# Patient Record
Sex: FEMALE | Race: WHITE
[De-identification: names, ages, dates, MRNs, and addresses within clinical notes are randomized per-mention and may not be internally consistent; named-entity substitution may affect disease eponyms.]

---

## 2018-10-21 NOTE — RAD
CTA CHEST, PE PROTOCOL, CT ABDOMEN AND PELVIS WITH CONTRAST

 

Clinical Indication: Chest pain, shortness of air, abdominal pain

 

COMPARISON: CT abdomen and pelvis dated 11/27/2017, chest radiograph dated

11/27/2017

 

TECHNIQUE:

 

Multiple contiguous axial images were obtained throughout the chest, 

abdomen, and pelvis with the use of IV contrast. Chest images were 

obtained following PE protocol. Coronal and sagittal MIPS reconstructions 

were performed. Coronal and sagittal reconstructions of the abdomen and 

pelvis CT were also performed. 75 mL Omni 300 was administered.

 

CTA Chest Findings:  

Limited evaluation of the distal pulmonary arterial system due to 

suboptimal distal contrast opacification. No evidence of central, lobar, 

or segmental pulmonary embolism.

 

The thyroid is symmetric. 

 

Calcified mediastinal and right hilar lymph nodes related to remote 

granulomatous disease. There is no axillary, mediastinal, or hilar 

adenopathy.  

 

The thoracic aorta diameter is normal. The cardiac size is normal.  There 

is no pericardial effusion.

 

Right lower lobe linear opacities likely related to atelectasis or 

scarring. There is no suspicious pulmonary nodule. There is no  focal 

consolidation. No pleural effusion is observed. There is no pneumothorax. 

Small tracheal diverticulum. The central airways are patent.

 

 

CT Abdomen findings:

The liver is enlarged measuring 19.2 cm. There again may be a 

microlobulated contour of the liver. The liver is otherwise normal. 

Contracted gallbladder. No radiopaque gallstone or pericholecystic fluid. 

The spleen is enlarged measuring 14.5 cm. The pancreas is normal in 

appearance. The adrenal glands are normal in appearance. The kidneys are 

unremarkable.  There is no significant mesenteric or retroperitoneal 

adenopathy identified.  There is no evidence of free intraperitoneal fluid

or pneumoperitoneum.  Mild colonic diverticulosis without evidence of 

diverticulitis. Visualized portions of the bowel are otherwise grossly 

unremarkable. Normal appendix. Mild atherosclerosis of the abdominal aorta

and its branches.

 

CT Pelvis findings:

The bladder is underdistended limiting evaluation for wall thickening. 

Fluid within the endometrial canal. 2.7 x 2.4 cm left ovarian cyst.  There

is no significant pelvic ascites.  No significant iliac or inguinal 

adenopathy is identified.  

 

No acute osseous abnormality. Mild multilevel degenerative changes of the 

visualized spine.

 

IMPRESSION:

1. Limited evaluation of the distal pulmonary arterial system due to 

suboptimal distal contrast opacification. No evidence of central, lobar, 

or segmental pulmonary embolism.

2. No acute intrathoracic, intra-abdominal, or intrapelvic process 

identified.

3. Hepatosplenomegaly. Possible microlobular contour of the liver which 

can be seen with cirrhosis, similar to prior exam.

4. Fluid within the endometrial canal and 2.7 x 2.4 cm left ovarian cyst 

are most likely physiologic given patient's age.

 

 

 

PQRS Compliance Statement:

 

One or more of the following individualized dose reduction techniques were

utilized for this examination:  

1. Automated exposure control  

2. Adjustment of the mA and/or kV according to patient size  

3. Use of iterative reconstruction technique

 

Electronically signed by: John Ochoa MD (10/21/2018 4:55 PM) 

[facility]

## 2018-10-21 NOTE — RAD
CT HEAD WO CONTRAST 

 

Date: 10/21/2018 4:41 PM 

 

Clinical Indication: HEADACHE

 

Comparison: CT head dated 11/27/2017.

 

Technique:  5 mm axial tomographic images were obtained of the head 

without contrast. These were viewed on brain and bone windows. 

 

CT HEAD FINDINGS:

The brain parenchyma is normal in attenuation. No intra- or extra-axial 

mass or fluid collection. No acute hemorrhage. The ventricles are normal 

in size, shape, and morphology. The gray-white matter junction is normal. 

The basilar cisterns are patent. 

 

Small bilateral maxillary sinus mucus retention cysts. The nasal septum is

deviated to the right. The orbits are normal. The globes are intact. The 

mastoid air cells are clear. No aggressive osseous lesion or fracture. 

 

Impression:

No acute intracranial process.

 

 

 

 

 

PQRS Compliance Statement:

 

One or more of the following individualized dose reduction techniques were

utilized for this examination:  

1. Automated exposure control  

2. Adjustment of the mA and/or kV according to patient size  

3. Use of iterative reconstruction technique

 

Electronically signed by: John Ochoa MD (10/21/2018 5:55 PM) 

[facility]

## 2018-10-21 NOTE — RAD
PORTABLE CHEST 1V

 

INDICATION:  CHEST PAIN, NECK PAIN, ABD PAIN X 3 DAYS.  HX HYPERTENSION. 

 

COMPARISON:  Chest radiograph dated 11/27/2017

 

FINDINGS:  

 

Low lung volume. Bibasilar heterogenous airspace opacities. Unchanged 

pulmonary vasculature. No pleural effusion or pneumothorax. The 

cardiomediastinal silhouette is normal. Mildly atherosclerotic thoracic 

aorta. No acute osseous abnormality.

 

IMPRESSION:  

Bibasilar heterogenous airspace opacities could relate to atelectasis. 

Underlying infectious process or asymmetric edema would be difficult to 

exclude. 

 

Electronically signed by: John Ochoa MD (10/21/2018 2:53 PM) 

[facility]

## 2018-10-21 NOTE — PHYS DOC
Past Medical History


Past Medical History:  Hypertension, Hepatitis, Other


Additional Past Medical Histor:  HEP C, OCD,ADHD, ENCEPHALITIS, PLEURISEY


Past Surgical History:  


Smoking:  Cigarettes, 1 Pack Per Day


Alcohol Use:  None


Drug Use:  Marijuana





Adult General


Chief Complaint


Chief Complaint:  multiple medical complaints





HPI


HPI


Patient is a 48-year-old female presents to the emergency department for 

evaluation. She states that for the past 3 days she has had pain all over her 

body, both arthralgias and myalgias. She admits to having some anterior chest 

discomfort which began yesterday, and has been persistent. The pain is 

described as an achy pain in the left side of her chest and is worse with deep 

breathing. She also reports some generalized abdominal pain worsen her upper 

abdomen. She states she has had a few episodes of vomiting and has not had a 

bowel movement in a few days. She has not had any bloody emesis, or bloody 

stools. She does feel somewhat more short of breath and baseline. She has not 

had any fevers or chills. She has a history of hypertension, but states that 

she does not have a primary care provider, does not take any medications. Other 

than the stated above, there are no alleviating or exacerbating factors to her 

symptoms.





Review of Systems


Review of Systems





Constitutional: Denies fever or chills []


Eyes: Denies change in visual acuity, redness, or eye pain []


HENT: Denies nasal congestion or sore throat []


Respiratory: Denies cough or shortness of breath []


Cardiovascular: No additional information not addressed in HPI []


GI: Denies bloody stools or diarrhea []


: Denies dysuria or hematuria []


Musculoskeletal: Reports diffuse myalgias and arthralgias[]


Integument: Denies rash or skin lesions []


Neurologic: Denies headache, focal weakness or sensory changes []


Endocrine: Denies polyuria or polydipsia []





All other systems were reviewed and found to be within normal limits, except as 

documented in this note.





Current Medications


Current Medications





Current Medications








 Medications


  (Trade)  Dose


 Ordered  Sig/Curtis  Start Time


 Stop Time Status Last Admin


Dose Admin


 


 Info


  (CONTRAST GIVEN


 -- Rx MONITORING)  1 each  PRN DAILY  PRN  10/21/18 14:45


 10/23/18 14:44   





 


 Iohexol


  (Omnipaque 300


 Mg/ml)  75 ml  1X  ONCE  10/21/18 14:45


 10/21/18 14:46 DC 10/21/18 16:00


75 ML


 


 Labetalol HCl


  (Normodyne Iv


 Push)  20 mg  1X  ONCE  10/21/18 14:30


 10/21/18 14:31 DC 10/21/18 14:57


20 MG


 


 Morphine Sulfate


  (Morphine


 Sulfate)  4 mg  PRN Q15MIN  PRN  10/21/18 14:30


 10/22/18 14:29   





 


 Nitroglycerin


  (Nitro-Bid Oint)  1 inch  1X  ONCE  10/21/18 14:30


 10/21/18 14:31 DC 10/21/18 14:56


1 INCH











Allergies


Allergies





Allergies








Coded Allergies Type Severity Reaction Last Updated Verified


 


  No Known Drug Allergies    17 No











Physical Exam


Physical Exam


PHYSICAL EXAM:





CONSTITUTIONAL: Well developed, well nourished


HEAD: normocephalic, atraumatic


EENT: PERRL, EOMI. Conjunctivae normal color, sclerae non-icteric; moist mucous 

membranes.


NECK: Supple, non-tender; no meningismus.


LUNGS: Lungs CTA, breathing even and unlabored. Normal air movement.


HEART: Regular rate and rhythm, no murmur


CHEST: No deformity; there is diffuse tenderness to palpation to the anterior 

chest wall.


ABDOMEN: The abdomen is soft, mildly distended. Normal bowel sounds are 

present. There is diffuse tenderness to palpation to the entire abdomen, 

without focal tenderness, rebound, or guarding. no masses or bruits.


EXTREM: Normal ROM; no deformity, no calf tenderness. Normal pulses palpable in 

all extremities. There is no pedal edema.


SKIN: No rash; no diaphoresis


NEURO: Alert; normal speech and cognition; CN's grossly intact; strength 

grossly intact without focal deficit.


BACK: No CVA TTP.





Current Patient Data


Vital Signs





 Vital Signs








  Date Time  Temp Pulse Resp B/P (MAP) Pulse Ox O2 Delivery O2 Flow Rate FiO2


 


10/21/18 14:57  112  216/95    


 


10/21/18 14:11 99.6  20  98 Room Air  





 99.6       








Lab Values





 Laboratory Tests








Test


 10/21/18


14:38 10/21/18


15:07 10/21/18


15:36


 


White Blood Count


 7.2 x10^3/uL


(4.0-11.0) 


 





 


Red Blood Count


 4.29 x10^6/uL


(3.50-5.40) 


 





 


Hemoglobin


 11.0 g/dL


(12.0-15.5)  L 


 





 


Hematocrit


 33.3 %


(36.0-47.0)  L 


 





 


Mean Corpuscular Volume


 78 fL ()


L 


 





 


Mean Corpuscular Hemoglobin 26 pg (25-35)    


 


Mean Corpuscular Hemoglobin


Concent 33 g/dL


(31-37) 


 





 


Red Cell Distribution Width


 16.1 %


(11.5-14.5)  H 


 





 


Platelet Count


 202 x10^3/uL


(140-400) 


 





 


Neutrophils (%) (Auto) 74 % (31-73)  H  


 


Lymphocytes (%) (Auto) 17 % (24-48)  L  


 


Monocytes (%) (Auto) 8 % (0-9)    


 


Eosinophils (%) (Auto) 1 % (0-3)    


 


Basophils (%) (Auto) 0 % (0-3)    


 


Neutrophils # (Auto)


 5.3 x10^3uL


(1.8-7.7) 


 





 


Lymphocytes # (Auto)


 1.2 x10^3/uL


(1.0-4.8) 


 





 


Monocytes # (Auto)


 0.6 x10^3/uL


(0.0-1.1) 


 





 


Eosinophils # (Auto)


 0.1 x10^3/uL


(0.0-0.7) 


 





 


Basophils # (Auto)


 0.0 x10^3/uL


(0.0-0.2) 


 





 


Sodium Level


 136 mmol/L


(136-145) 


 





 


Potassium Level


 3.7 mmol/L


(3.5-5.1) 


 





 


Chloride Level


 98 mmol/L


() 


 





 


Carbon Dioxide Level


 28 mmol/L


(21-32) 


 





 


Anion Gap 10 (6-14)    


 


Blood Urea Nitrogen


 7 mg/dL (7-20)


 


 





 


Creatinine


 0.9 mg/dL


(0.6-1.0) 


 





 


Estimated GFR


(Cockcroft-Gault) 66.5  


 


 





 


BUN/Creatinine Ratio 8 (6-20)    


 


Glucose Level


 216 mg/dL


(70-99)  H 


 





 


Lactic Acid Level


 2.1 mmol/L


(0.4-2.0)  H 


 





 


Calcium Level


 8.7 mg/dL


(8.5-10.1) 


 





 


Magnesium Level


 1.7 mg/dL


(1.8-2.4)  L 


 





 


Total Bilirubin


 0.5 mg/dL


(0.2-1.0) 


 





 


Aspartate Amino Transferase


(AST) 60 U/L (15-37)


H 


 





 


Alanine Aminotransferase (ALT)


 86 U/L (14-59)


H 


 





 


Alkaline Phosphatase


 115 U/L


() 


 





 


Creatine Kinase


 38 U/L


() 


 





 


Creatine Kinase MB (Mass)


 0.9 ng/mL


(0.0-3.6) 


 





 


Creatine Kinase MB Relative


Index 2.4 % (0-4)  


 


 





 


Troponin I Quantitative


 < 0.017 ng/mL


(0.000-0.055) 


 





 


NT-Pro-B-Type Natriuretic


Peptide 187 pg/mL


(0-124)  H 


 





 


Total Protein


 8.1 g/dL


(6.4-8.2) 


 





 


Albumin


 3.2 g/dL


(3.4-5.0)  L 


 





 


Albumin/Globulin Ratio


 0.7 (1.0-1.7)


L 


 





 


Lipase


 138 U/L


() 


 





 


Thyroid Stimulating Hormone


(TSH) 1.349 uIU/mL


(0.358-3.74) 


 





 


Free Thyroxine


 1.04 ng/dL


(0.76-1.46) 


 





 


Influenza Type A Antigen


 Negative


(NEGATIVE) 


 





 


Influenza Type B Antigen


 Negative


(NEGATIVE) 


 





 


Urine Collection Type  Unknown   


 


Urine Color  Yellow   


 


Urine Clarity  Cloudy   


 


Urine pH  7.0   


 


Urine Specific Gravity  >=1.030   


 


Urine Protein


 


 Negative mg/dL


(NEG-TRACE) 





 


Urine Glucose (UA)


 


 >=1000 mg/dL


(NEG) 





 


Urine Ketones (Stick)


 


 Negative mg/dL


(NEG) 





 


Urine Blood


 


 Negative (NEG)


 





 


Urine Nitrite


 


 Negative (NEG)


 





 


Urine Bilirubin


 


 Negative (NEG)


 





 


Urine Urobilinogen Dipstick


 


 1.0 mg/dL (0.2


mg/dL) 





 


Urine Leukocyte Esterase


 


 Negative (NEG)


 





 


Urine RBC  0 /HPF (0-2)   


 


Urine WBC  0 /HPF (0-4)   


 


Urine Squamous Epithelial


Cells 


 Mod /LPF  


 





 


Urine Bacteria


 


 0 /HPF (0-FEW)


 





 


Urine Mucus  Slight /LPF   


 


POC Urine HCG, Qualitative


 


 


 Hcg negative


(Negative)





 Laboratory Tests


10/21/18 14:38








 Laboratory Tests


10/21/18 14:38











EKG


EKG


[Sinus tachycardia rate of 117 beats for minute, normal axis, incomplete right 

bundle-branch block with otherwise normal intervals. There are no acute 

ischemic ST/T changes.]





Radiology/Procedures


Radiology/Procedures


[PROCEDURE: PORTABLE CHEST 1V





 


PORTABLE CHEST 1V


 


INDICATION:  CHEST PAIN, NECK PAIN, ABD PAIN X 3 DAYS.  HX HYPERTENSION. 


 


COMPARISON:  Chest radiograph dated 2017


 


FINDINGS:  


 


Low lung volume. Bibasilar heterogenous airspace opacities. Unchanged 


pulmonary vasculature. No pleural effusion or pneumothorax. The 


cardiomediastinal silhouette is normal. Mildly atherosclerotic thoracic 


aorta. No acute osseous abnormality.


 


IMPRESSION:  


Bibasilar heterogenous airspace opacities could relate to atelectasis. 


Underlying infectious process or asymmetric edema would be difficult to 


exclude. ]





Course & Med Decision Making


Course & Med Decision Making


Pertinent Labs and Imaging studies reviewed. (See chart for details)





[4:10 PM:] The patient's condition remained stable. BP currently 172/88. I 

discussed the case with the hospitalist, who will admit the patient for further 

evaluation and treatment. CT imaging reports chest, angiogram due to pleuritic 

pain, as well as abdomen due to the diffuse abdominal tenderness and pain, are 

currently pending.





Dragon Disclaimer


Dragon Disclaimer


This electronic medical record was generated, in whole or in part, using a 

voice recognition dictation system.





Departure


Departure


Impression:  


 Primary Impression:  


 Chest pain


 Additional Impressions:  


 Abdominal pain


 Hypertensive urgency


Disposition:   ADMITTED AS INPATIENT


Admitting Physician:  Charity Espino


Condition:  STABLE


Referrals:  


NO PCP (PCP)





Problem Qualifiers











STEFFEN INGRAM MD Oct 21, 2018 14:34

## 2018-10-21 NOTE — PDOC1
History and Physical


Date of Admission


Date of Admission


10/21/18





Identification/Chief Complaint


Chief Complaint


abd pain, headache, chest pain with deep breath





Source


Source:  Chart review, Patient





History of Present Illness


History of Present Illness





HPI


HPI


Patient is a 48-year-old female presents to the emergency department for 

evaluation of multiple complains, mainly for abd pain and headache. 





She has no insurance, no pcp, not taking meds.





She said she has been feeling diffuse abd pain for 3ds, mainly on left side, 

constant, severe, with myalgias, with N/V 3 times , non bloody or castillo. also 

has BM daily with intermittent constipation and blood in the stool sometimes 

for 1 year. said has possible hep C, not treated. 


She also has severe headache, with neck pain, headache is on the top head, 

tension, no vision or hearing change, noise and light not bothering her. hard 

to walk 2/2 abd pain. no numbness or tinglings. 


She has bl lower chest pain, with deep breath. + cough, subjective fever, with 

sweats, + smoking. 





LA 2.1 in ER. ct pending. Pt was here last year, for HTN urgency, not taking 

meds now. also had pleurisy at that time. BP >200.





Past Medical History


Cardiovascular:  HTN





Past Surgical History


Past Surgical History:  





Family History


Family History:  Hypertension





Social History


Smoke:  1 pack per day


ALCOHOL:  social


Drugs:  Other





Current Problem List


Problem List


Problems


Medical Problems:


(1) Abdominal pain


Status: Acute  





(2) Chest pain


Status: Acute  











Current Medications


Current Medications





Current Medications








 Medications


  (Trade)  Dose


 Ordered  Sig/Curtis  Start Time


 Stop Time Status Last Admin


Dose Admin


 


 Info


  (CONTRAST GIVEN


 -- Rx MONITORING)  1 each  PRN DAILY  PRN  10/21/18 14:45


 10/23/18 14:44   





 


 Iohexol


  (Omnipaque 300


 Mg/ml)  75 ml  1X  ONCE  10/21/18 14:45


 10/21/18 14:46 DC 10/21/18 16:00


75 ML


 


 Labetalol HCl


  (Normodyne Iv


 Push)  20 mg  1X  ONCE  10/21/18 14:30


 10/21/18 14:31 DC 10/21/18 14:57


20 MG


 


 Morphine Sulfate


  (Morphine


 Sulfate)  4 mg  PRN Q15MIN  PRN  10/21/18 14:30


 10/22/18 14:29   





 


 Nitroglycerin


  (Nitro-Bid Oint)  1 inch  1X  ONCE  10/21/18 14:30


 10/21/18 14:31 DC 10/21/18 14:56


1 INCH











Allergies


Allergies





Allergies








Coded Allergies Type Severity Reaction Last Updated Verified


 


  No Known Drug Allergies    17 No











ROS


Review of System


CONSTITUTIONAL:        No fever or chills


EYES:                          No recent changes


SKIN:               No rash or itching


CARDIOVASCULAR:     No chest pain, syncope, palpitations, or edema


RESPIRATORY:            No SOB or cough


GASTROINTESTINAL:    No nausea, vomiting or abdominal pain


NEUROLOGICAL:          No headaches or weakness


ENDOCRINE:               No cold or heat intolerance


GENITOURINARY:         No urgency or frequency of urination


MUSCULOSKELETAL:   No back pain or joint pain


LYMPHATICS:               No enlarged lymph nodes


PSYCHIATRIC:              No anxiety or depression





Physical Exam


Physical Exam


GEN.:      Alert and oriented. in pain. 


HEENT:    Head is normocephalic, atraumatic


NECK:    Supple.  


LUNGS:    Clear to auscultation.


HEART:    RRR, S1, S2 present.  Peripheral pulses intact


ABDOMEN:    Soft,  Positive bowel sounds. diffuse abd moderate tenderness , 

mainly at RUQ, LUQ. no guarding. 


EXTREMITIES:    Without any cyanosis.   bl leg some superficial dilated veins. 

and bruise.  


NEUROLOGIC:     Normal speech, normal tone


PSYCHIATRIC:    Normal affect, normal mood.


SKIN:   No ulcerations





Vitals


Vitals





Vital Signs








  Date Time  Temp Pulse Resp B/P (MAP) Pulse Ox O2 Delivery O2 Flow Rate FiO2


 


10/21/18 14:57  112  216/95    


 


10/21/18 14:11 99.6  20  98 Room Air  





 99.6       











Labs


Labs





Laboratory Tests








Test


 10/21/18


14:38 10/21/18


15:07 10/21/18


15:36


 


White Blood Count


 7.2 x10^3/uL


(4.0-11.0) 


 





 


Red Blood Count


 4.29 x10^6/uL


(3.50-5.40) 


 





 


Hemoglobin


 11.0 g/dL


(12.0-15.5) 


 





 


Hematocrit


 33.3 %


(36.0-47.0) 


 





 


Mean Corpuscular Volume 78 fL ()   


 


Mean Corpuscular Hemoglobin 26 pg (25-35)   


 


Mean Corpuscular Hemoglobin


Concent 33 g/dL


(31-37) 


 





 


Red Cell Distribution Width


 16.1 %


(11.5-14.5) 


 





 


Platelet Count


 202 x10^3/uL


(140-400) 


 





 


Neutrophils (%) (Auto) 74 % (31-73)   


 


Lymphocytes (%) (Auto) 17 % (24-48)   


 


Monocytes (%) (Auto) 8 % (0-9)   


 


Eosinophils (%) (Auto) 1 % (0-3)   


 


Basophils (%) (Auto) 0 % (0-3)   


 


Neutrophils # (Auto)


 5.3 x10^3uL


(1.8-7.7) 


 





 


Lymphocytes # (Auto)


 1.2 x10^3/uL


(1.0-4.8) 


 





 


Monocytes # (Auto)


 0.6 x10^3/uL


(0.0-1.1) 


 





 


Eosinophils # (Auto)


 0.1 x10^3/uL


(0.0-0.7) 


 





 


Basophils # (Auto)


 0.0 x10^3/uL


(0.0-0.2) 


 





 


Sodium Level


 136 mmol/L


(136-145) 


 





 


Potassium Level


 3.7 mmol/L


(3.5-5.1) 


 





 


Chloride Level


 98 mmol/L


() 


 





 


Carbon Dioxide Level


 28 mmol/L


(21-32) 


 





 


Anion Gap 10 (6-14)   


 


Blood Urea Nitrogen 7 mg/dL (7-20)   


 


Creatinine


 0.9 mg/dL


(0.6-1.0) 


 





 


Estimated GFR


(Cockcroft-Gault) 66.5 


 


 





 


BUN/Creatinine Ratio 8 (6-20)   


 


Glucose Level


 216 mg/dL


(70-99) 


 





 


Lactic Acid Level


 2.1 mmol/L


(0.4-2.0) 


 





 


Calcium Level


 8.7 mg/dL


(8.5-10.1) 


 





 


Magnesium Level


 1.7 mg/dL


(1.8-2.4) 


 





 


Total Bilirubin


 0.5 mg/dL


(0.2-1.0) 


 





 


Aspartate Amino Transf


(AST/SGOT) 60 U/L (15-37) 


 


 





 


Alanine Aminotransferase


(ALT/SGPT) 86 U/L (14-59) 


 


 





 


Alkaline Phosphatase


 115 U/L


() 


 





 


Creatine Kinase


 38 U/L


() 


 





 


Creatine Kinase MB (Mass)


 0.9 ng/mL


(0.0-3.6) 


 





 


Creatine Kinase MB Relative


Index 2.4 % (0-4) 


 


 





 


Troponin I Quantitative


 < 0.017 ng/mL


(0.000-0.055) 


 





 


NT-Pro-B-Type Natriuretic


Peptide 187 pg/mL


(0-124) 


 





 


Total Protein


 8.1 g/dL


(6.4-8.2) 


 





 


Albumin


 3.2 g/dL


(3.4-5.0) 


 





 


Albumin/Globulin Ratio 0.7 (1.0-1.7)   


 


Lipase


 138 U/L


() 


 





 


Thyroid Stimulating Hormone


(TSH) 1.349 uIU/mL


(0.358-3.74) 


 





 


Free Thyroxine


 1.04 ng/dL


(0.76-1.46) 


 





 


Influenza Type A Antigen


 Negative


(NEGATIVE) 


 





 


Influenza Type B Antigen


 Negative


(NEGATIVE) 


 





 


Urine Collection Type  Unknown  


 


Urine Color  Yellow  


 


Urine Clarity  Cloudy  


 


Urine pH  7.0  


 


Urine Specific Gravity  >=1.030  


 


Urine Protein


 


 Negative mg/dL


(NEG-TRACE) 





 


Urine Glucose (UA)


 


 >=1000 mg/dL


(NEG) 





 


Urine Ketones (Stick)


 


 Negative mg/dL


(NEG) 





 


Urine Blood  Negative (NEG)  


 


Urine Nitrite  Negative (NEG)  


 


Urine Bilirubin  Negative (NEG)  


 


Urine Urobilinogen Dipstick


 


 1.0 mg/dL (0.2


mg/dL) 





 


Urine Leukocyte Esterase  Negative (NEG)  


 


Urine RBC  0 /HPF (0-2)  


 


Urine WBC  0 /HPF (0-4)  


 


Urine Squamous Epithelial


Cells 


 Mod /LPF 


 





 


Urine Bacteria  0 /HPF (0-FEW)  


 


Urine Mucus  Slight /LPF  


 


Bedside Urine HCG, Qualitative


 


 


 Hcg negative


(Negative)








Laboratory Tests








Test


 10/21/18


14:38 10/21/18


15:07 10/21/18


15:36


 


White Blood Count


 7.2 x10^3/uL


(4.0-11.0) 


 





 


Red Blood Count


 4.29 x10^6/uL


(3.50-5.40) 


 





 


Hemoglobin


 11.0 g/dL


(12.0-15.5) 


 





 


Hematocrit


 33.3 %


(36.0-47.0) 


 





 


Mean Corpuscular Volume 78 fL ()   


 


Mean Corpuscular Hemoglobin 26 pg (25-35)   


 


Mean Corpuscular Hemoglobin


Concent 33 g/dL


(31-37) 


 





 


Red Cell Distribution Width


 16.1 %


(11.5-14.5) 


 





 


Platelet Count


 202 x10^3/uL


(140-400) 


 





 


Neutrophils (%) (Auto) 74 % (31-73)   


 


Lymphocytes (%) (Auto) 17 % (24-48)   


 


Monocytes (%) (Auto) 8 % (0-9)   


 


Eosinophils (%) (Auto) 1 % (0-3)   


 


Basophils (%) (Auto) 0 % (0-3)   


 


Neutrophils # (Auto)


 5.3 x10^3uL


(1.8-7.7) 


 





 


Lymphocytes # (Auto)


 1.2 x10^3/uL


(1.0-4.8) 


 





 


Monocytes # (Auto)


 0.6 x10^3/uL


(0.0-1.1) 


 





 


Eosinophils # (Auto)


 0.1 x10^3/uL


(0.0-0.7) 


 





 


Basophils # (Auto)


 0.0 x10^3/uL


(0.0-0.2) 


 





 


Sodium Level


 136 mmol/L


(136-145) 


 





 


Potassium Level


 3.7 mmol/L


(3.5-5.1) 


 





 


Chloride Level


 98 mmol/L


() 


 





 


Carbon Dioxide Level


 28 mmol/L


(21-32) 


 





 


Anion Gap 10 (6-14)   


 


Blood Urea Nitrogen 7 mg/dL (7-20)   


 


Creatinine


 0.9 mg/dL


(0.6-1.0) 


 





 


Estimated GFR


(Cockcroft-Gault) 66.5 


 


 





 


BUN/Creatinine Ratio 8 (6-20)   


 


Glucose Level


 216 mg/dL


(70-99) 


 





 


Lactic Acid Level


 2.1 mmol/L


(0.4-2.0) 


 





 


Calcium Level


 8.7 mg/dL


(8.5-10.1) 


 





 


Magnesium Level


 1.7 mg/dL


(1.8-2.4) 


 





 


Total Bilirubin


 0.5 mg/dL


(0.2-1.0) 


 





 


Aspartate Amino Transf


(AST/SGOT) 60 U/L (15-37) 


 


 





 


Alanine Aminotransferase


(ALT/SGPT) 86 U/L (14-59) 


 


 





 


Alkaline Phosphatase


 115 U/L


() 


 





 


Creatine Kinase


 38 U/L


() 


 





 


Creatine Kinase MB (Mass)


 0.9 ng/mL


(0.0-3.6) 


 





 


Creatine Kinase MB Relative


Index 2.4 % (0-4) 


 


 





 


Troponin I Quantitative


 < 0.017 ng/mL


(0.000-0.055) 


 





 


NT-Pro-B-Type Natriuretic


Peptide 187 pg/mL


(0-124) 


 





 


Total Protein


 8.1 g/dL


(6.4-8.2) 


 





 


Albumin


 3.2 g/dL


(3.4-5.0) 


 





 


Albumin/Globulin Ratio 0.7 (1.0-1.7)   


 


Lipase


 138 U/L


() 


 





 


Thyroid Stimulating Hormone


(TSH) 1.349 uIU/mL


(0.358-3.74) 


 





 


Free Thyroxine


 1.04 ng/dL


(0.76-1.46) 


 





 


Influenza Type A Antigen


 Negative


(NEGATIVE) 


 





 


Influenza Type B Antigen


 Negative


(NEGATIVE) 


 





 


Urine Collection Type  Unknown  


 


Urine Color  Yellow  


 


Urine Clarity  Cloudy  


 


Urine pH  7.0  


 


Urine Specific Gravity  >=1.030  


 


Urine Protein


 


 Negative mg/dL


(NEG-TRACE) 





 


Urine Glucose (UA)


 


 >=1000 mg/dL


(NEG) 





 


Urine Ketones (Stick)


 


 Negative mg/dL


(NEG) 





 


Urine Blood  Negative (NEG)  


 


Urine Nitrite  Negative (NEG)  


 


Urine Bilirubin  Negative (NEG)  


 


Urine Urobilinogen Dipstick


 


 1.0 mg/dL (0.2


mg/dL) 





 


Urine Leukocyte Esterase  Negative (NEG)  


 


Urine RBC  0 /HPF (0-2)  


 


Urine WBC  0 /HPF (0-4)  


 


Urine Squamous Epithelial


Cells 


 Mod /LPF 


 





 


Urine Bacteria  0 /HPF (0-FEW)  


 


Urine Mucus  Slight /LPF  


 


Bedside Urine HCG, Qualitative


 


 


 Hcg negative


(Negative)











VTE Prophylaxis Ordered


VTE Prophylaxis Devices:  Yes


VTE Pharmacological Prophylaxi:  Yes





Assessment/Plan


Assessment/Plan








abd pain, not clear etiology yet, CT pending


bl chest pain  2/2 pleurisy likely


headache, 2/2 HTN urgency likely


HTN urgency


chronic bloody stool as per pt with mild anemia


high LA


h/o hep C wo treatemtn


OCD


ADHD


H/O encephalitis


tobaccoism


h/o drug abuse with marijuana


mild malnutrition





plan:


pulm, gi , card consult


abd  ct pending, Chest cta pending


clear liquid diet for now


dvt, gi ppx


check drug tox, hepatitis panel, anemia panel


check anemia panel


nicotine patch prn


duoneb, abluterol prn


head ct











SHANNAN MCKINNEY MD Oct 21, 2018 16:41

## 2018-10-21 NOTE — EKG
[facility]

              [address] [place] [address]

Test Date:    2018-10-21               Test Time:    14:12:45

Pat Name:     PIPO HAQ              Department:   

Patient ID:   [id number]           Room:          

Gender:       F                        Technician:   

:          [id number]               Requested By: STEFFEN INGRAM

Order Number: [id number]           Reading MD:   Ilya Zimmer MD

                                 Measurements

Intervals                              Axis          

Rate:         117                      P:            54

CT:           120                      QRS:          48

QRSD:         82                       T:            51

QT:           306                                    

QTc:          431                                    

                           Interpretive Statements

SINUS TACHYCARDIA



Electronically Signed On 10- 12:26:52 CDT by Ilya Zimmer MD

## 2018-10-21 NOTE — RAD
CTA CHEST, PE PROTOCOL, CT ABDOMEN AND PELVIS WITH CONTRAST

 

Clinical Indication: Chest pain, shortness of air, abdominal pain

 

COMPARISON: CT abdomen and pelvis dated 11/27/2017, chest radiograph dated

11/27/2017

 

TECHNIQUE:

 

Multiple contiguous axial images were obtained throughout the chest, 

abdomen, and pelvis with the use of IV contrast. Chest images were 

obtained following PE protocol. Coronal and sagittal MIPS reconstructions 

were performed. Coronal and sagittal reconstructions of the abdomen and 

pelvis CT were also performed. 75 mL Omni 300 was administered.

 

CTA Chest Findings:  

Limited evaluation of the distal pulmonary arterial system due to 

suboptimal distal contrast opacification. No evidence of central, lobar, 

or segmental pulmonary embolism.

 

The thyroid is symmetric. 

 

Calcified mediastinal and right hilar lymph nodes related to remote 

granulomatous disease. There is no axillary, mediastinal, or hilar 

adenopathy.  

 

The thoracic aorta diameter is normal. The cardiac size is normal.  There 

is no pericardial effusion.

 

Right lower lobe linear opacities likely related to atelectasis or 

scarring. There is no suspicious pulmonary nodule. There is no  focal 

consolidation. No pleural effusion is observed. There is no pneumothorax. 

Small tracheal diverticulum. The central airways are patent.

 

 

CT Abdomen findings:

The liver is enlarged measuring 19.2 cm. There again may be a 

microlobulated contour of the liver. The liver is otherwise normal. 

Contracted gallbladder. No radiopaque gallstone or pericholecystic fluid. 

The spleen is enlarged measuring 14.5 cm. The pancreas is normal in 

appearance. The adrenal glands are normal in appearance. The kidneys are 

unremarkable.  There is no significant mesenteric or retroperitoneal 

adenopathy identified.  There is no evidence of free intraperitoneal fluid

or pneumoperitoneum.  Mild colonic diverticulosis without evidence of 

diverticulitis. Visualized portions of the bowel are otherwise grossly 

unremarkable. Normal appendix. Mild atherosclerosis of the abdominal aorta

and its branches.

 

CT Pelvis findings:

The bladder is underdistended limiting evaluation for wall thickening. 

Fluid within the endometrial canal. 2.7 x 2.4 cm left ovarian cyst.  There

is no significant pelvic ascites.  No significant iliac or inguinal 

adenopathy is identified.  

 

No acute osseous abnormality. Mild multilevel degenerative changes of the 

visualized spine.

 

IMPRESSION:

1. Limited evaluation of the distal pulmonary arterial system due to 

suboptimal distal contrast opacification. No evidence of central, lobar, 

or segmental pulmonary embolism.

2. No acute intrathoracic, intra-abdominal, or intrapelvic process 

identified.

3. Hepatosplenomegaly. Possible microlobular contour of the liver which 

can be seen with cirrhosis, similar to prior exam.

4. Fluid within the endometrial canal and 2.7 x 2.4 cm left ovarian cyst 

are most likely physiologic given patient's age.

 

 

 

PQRS Compliance Statement:

 

One or more of the following individualized dose reduction techniques were

utilized for this examination:  

1. Automated exposure control  

2. Adjustment of the mA and/or kV according to patient size  

3. Use of iterative reconstruction technique

 

Electronically signed by: John Ochoa MD (10/21/2018 4:55 PM) 

[facility]

## 2018-10-22 NOTE — PDOC2
MALORIE HINOJOSA APRN 10/22/18 1017:


CARDIAC CONSULT


DATE OF CONSULT


Date of Consult


DATE: 10/22/18 


TIME: 10:10





REASON FOR CONSULT


Reason for Consult:


Chest Pain





REFERRING PHYSICIAN


Referring Physician:


Dr. Agarwal





SOURCE


Source:  Chart review, Patient





HISTORY OF PRESENT ILLNESS


HISTORY OF PRESENT ILLNESS


This is a 47 yo female who presented with complaints of abdominal pain. 

Presented for the last 3 days. Progressively worsening. Radiating to her right 

chest. C/o whole body aches. Chest pain describes as sharp. Worsened with deep 

breath. No associated SOA, dizziness, diaphoresis, or palpitations. Have had 

some mild nausea/vomiting over the last couple of days. Has a history of 

hypertension. Unfortunately, patient is uninsured and has limited financial 

means. Was previous on antiHTN therapy, but ran out and has not been seen 

since. Is presently residing with friend. Of note, is current smoker and UDS + 

for methamphetamines.





PAST MEDICAL HISTORY


Cardiovascular:  HTN


Pulmonary:  No pertinent hx


GI:  GERD


Heme/Onc:  No pertinent hx


Hepatobiliary:  No pertinent hx


Psych:  Anxiety, Depression


Musculoskeletal:  Osteoarthritis


Rheumatologic:  No pertinent hx


Infectious disease:  No pertinent hx


ENT:  No pertinent hx


Renal/:  No pertinent hx


Endocrine:  No pertinent hx


Dermatology:  No pertinent hx





PAST SURGICAL HISTORY


Past Surgical History:  No pertinent history





FAMILY HISTORY


Family History:  Diabetes, Hypertension





SOCIAL HISTORY


Smoke:  1 pack per day


ALCOHOL:  none


Drugs:  Marijuana, Crystal meth


Lives:  Friends





CURRENT MEDICATIONS


CURRENT MEDICATIONS





Current Medications








 Medications


  (Trade)  Dose


 Ordered  Sig/Curtis


 Route


 PRN Reason  Start Time


 Stop Time Status Last Admin


Dose Admin


 


 Morphine Sulfate


  (Morphine


 Sulfate)  4 mg  PRN Q15MIN  PRN


 IV/SQ


 PAIN GREATER THAN 3/10  10/21/18 14:30


 10/22/18 14:29  10/21/18 17:11





 


 Labetalol HCl


  (Normodyne Iv


 Push)  20 mg  1X  ONCE


 IVP


   10/21/18 14:30


 10/21/18 14:31 DC 10/21/18 14:57





 


 Nitroglycerin


  (Nitro-Bid Oint)  1 inch  1X  ONCE


 TP


   10/21/18 14:30


 10/21/18 14:31 DC 10/21/18 14:56





 


 Iohexol


  (Omnipaque 300


 Mg/ml)  75 ml  1X  ONCE


 IV


   10/21/18 14:45


 10/21/18 14:46 DC 10/21/18 16:00





 


 Morphine Sulfate


  (Morphine


 Sulfate)  2 mg  PRN Q2HR  PRN


 IV


 MODERATE TO SEVERE PAIN  10/21/18 16:30


    10/22/18 01:33





 


 Tramadol HCl


  (Ultram)  50 mg  PRN Q6HRS  PRN


 PO


 MILD TO MODERATE PAIN  10/21/18 16:30


    10/21/18 23:37





 


 Lisinopril


  (Prinivil)  40 mg  DAILY


 PO


   10/21/18 17:00


    10/22/18 09:10





 


 Amlodipine


 Besylate


  (Norvasc)  10 mg  DAILY


 PO


   10/21/18 17:00


    10/22/18 09:10





 


 Sodium Chloride  1,000 ml @ 


 100 mls/hr  Q10H


 IV


   10/21/18 16:30


    10/22/18 03:33





 


 Magnesium Sulfate  50 ml @ 25


 mls/hr  1X  ONCE


 IV


   10/21/18 16:45


 10/21/18 18:44 DC 10/21/18 21:05





 


 Pantoprazole


 Sodium


  (Protonix)  40 mg  DAILYAC


 PO


   10/22/18 07:30


    10/22/18 07:36





 


 Enoxaparin Sodium


  (Lovenox 40mg


 Syringe)  40 mg  Q24H


 SQ


   10/21/18 21:00


    10/21/18 21:12





 


 Albuterol/


 Ipratropium


  (Duoneb)  3 ml  RTQID


 NEB


   10/21/18 16:30


    10/22/18 07:15





 


 Guaifenesin


  (Mucinex)  600 mg  BID


 PO


   10/21/18 21:00


    10/22/18 09:09





 


 Nicotine


  (Nicoderm Cq


 21mg)  1 patch  DAILY


 TD


   10/21/18 18:30


    10/22/18 09:11





 


 Fentanyl Citrate


  (Fentanyl 2ml


 Vial)  50 mcg  PRN Q2HR  PRN


 IV


 PAIN  10/22/18 02:30


    10/22/18 07:41














ALLERGIES


ALLERGIES:  


Coded Allergies:  


     No Known Drug Allergies (Unverified , 11/27/17)





ROS


Review of System


14 point ROS conducted with pertinent positives noted above in HPI.





PHYSICAL EXAM


General:  Alert, Oriented X3, Cooperative, mild distress


HEENT:  Atraumatic, Mucous membr. moist/pink


Lungs:  Clear to auscultation, Normal air movement


Heart:  Regular rate, Normal S1, Normal S2, Other (2/6 systolic murmur )


Abdomen:  Other (distended. tenderness upon palpation)


Extremities:  No edema, Normal pulses


Skin:  No significant lesion


Neuro:  Normal speech, Sensation intact


Psych/Mental Status:  Mental status NL, Mood NL


MUSCULOSKELETAL:  Osteoarthritic changes both hands





VITALS


VITALS





Vital Signs








  Date Time  Temp Pulse Resp B/P (MAP) Pulse Ox O2 Delivery O2 Flow Rate FiO2


 


10/22/18 09:10  91  135/73    


 


10/22/18 08:11   18   Room Air  


 


10/22/18 07:15     92   


 


10/22/18 03:00 98.7       





 98.7       











LABS


Lab:





Laboratory Tests








Test


 10/21/18


14:38 10/21/18


15:07 10/21/18


15:36 10/21/18


22:00


 


White Blood Count


 7.2 x10^3/uL


(4.0-11.0) 


 


 





 


Red Blood Count


 4.29 x10^6/uL


(3.50-5.40) 


 


 





 


Hemoglobin


 11.0 g/dL


(12.0-15.5) 


 


 





 


Hematocrit


 33.3 %


(36.0-47.0) 


 


 





 


Mean Corpuscular Volume 78 fL ()    


 


Mean Corpuscular Hemoglobin 26 pg (25-35)    


 


Mean Corpuscular Hemoglobin


Concent 33 g/dL


(31-37) 


 


 





 


Red Cell Distribution Width


 16.1 %


(11.5-14.5) 


 


 





 


Platelet Count


 202 x10^3/uL


(140-400) 


 


 





 


Neutrophils (%) (Auto) 74 % (31-73)    


 


Lymphocytes (%) (Auto) 17 % (24-48)    


 


Monocytes (%) (Auto) 8 % (0-9)    


 


Eosinophils (%) (Auto) 1 % (0-3)    


 


Basophils (%) (Auto) 0 % (0-3)    


 


Neutrophils # (Auto)


 5.3 x10^3uL


(1.8-7.7) 


 


 





 


Lymphocytes # (Auto)


 1.2 x10^3/uL


(1.0-4.8) 


 


 





 


Monocytes # (Auto)


 0.6 x10^3/uL


(0.0-1.1) 


 


 





 


Eosinophils # (Auto)


 0.1 x10^3/uL


(0.0-0.7) 


 


 





 


Basophils # (Auto)


 0.0 x10^3/uL


(0.0-0.2) 


 


 





 


Sodium Level


 136 mmol/L


(136-145) 


 


 





 


Potassium Level


 3.7 mmol/L


(3.5-5.1) 


 


 





 


Chloride Level


 98 mmol/L


() 


 


 





 


Carbon Dioxide Level


 28 mmol/L


(21-32) 


 


 





 


Anion Gap 10 (6-14)    


 


Blood Urea Nitrogen 7 mg/dL (7-20)    


 


Creatinine


 0.9 mg/dL


(0.6-1.0) 


 


 





 


Estimated GFR


(Cockcroft-Gault) 66.5 


 


 


 





 


BUN/Creatinine Ratio 8 (6-20)    


 


Glucose Level


 216 mg/dL


(70-99) 


 


 





 


Lactic Acid Level


 2.1 mmol/L


(0.4-2.0) 


 


 





 


Calcium Level


 8.7 mg/dL


(8.5-10.1) 


 


 





 


Magnesium Level


 1.7 mg/dL


(1.8-2.4) 


 


 





 


Total Bilirubin


 0.5 mg/dL


(0.2-1.0) 


 


 





 


Aspartate Amino Transf


(AST/SGOT) 60 U/L (15-37) 


 


 


 





 


Alanine Aminotransferase


(ALT/SGPT) 86 U/L (14-59) 


 


 


 





 


Alkaline Phosphatase


 115 U/L


() 


 


 





 


Creatine Kinase


 38 U/L


() 


 


 





 


Creatine Kinase MB (Mass)


 0.9 ng/mL


(0.0-3.6) 


 


 





 


Creatine Kinase MB Relative


Index 2.4 % (0-4) 


 


 


 





 


Troponin I Quantitative


 < 0.017 ng/mL


(0.000-0.055) 


 


 < 0.017 ng/mL


(0.000-0.055)


 


NT-Pro-B-Type Natriuretic


Peptide 187 pg/mL


(0-124) 


 


 





 


Total Protein


 8.1 g/dL


(6.4-8.2) 


 


 





 


Albumin


 3.2 g/dL


(3.4-5.0) 


 


 





 


Albumin/Globulin Ratio 0.7 (1.0-1.7)    


 


Lipase


 138 U/L


() 


 


 





 


Thyroid Stimulating Hormone


(TSH) 1.349 uIU/mL


(0.358-3.74) 


 


 





 


Free Thyroxine


 1.04 ng/dL


(0.76-1.46) 


 


 





 


Influenza Type A Antigen


 Negative


(NEGATIVE) 


 


 





 


Influenza Type B Antigen


 Negative


(NEGATIVE) 


 


 





 


Urine Collection Type  Unknown   


 


Urine Color  Yellow   


 


Urine Clarity  Cloudy   


 


Urine pH  7.0   


 


Urine Specific Gravity  >=1.030   


 


Urine Protein


 


 Negative mg/dL


(NEG-TRACE) 


 





 


Urine Glucose (UA)


 


 >=1000 mg/dL


(NEG) 


 





 


Urine Ketones (Stick)


 


 Negative mg/dL


(NEG) 


 





 


Urine Blood  Negative (NEG)   


 


Urine Nitrite  Negative (NEG)   


 


Urine Bilirubin  Negative (NEG)   


 


Urine Urobilinogen Dipstick


 


 1.0 mg/dL (0.2


mg/dL) 


 





 


Urine Leukocyte Esterase  Negative (NEG)   


 


Urine RBC  0 /HPF (0-2)   


 


Urine WBC  0 /HPF (0-4)   


 


Urine Squamous Epithelial


Cells 


 Mod /LPF 


 


 





 


Urine Bacteria  0 /HPF (0-FEW)   


 


Urine Mucus  Slight /LPF   


 


Urine Opiates Screen  Neg (NEG)   


 


Urine Methadone Screen  Neg (NEG)   


 


Urine Barbiturates  Neg (NEG)   


 


Urine Phencyclidine Screen  Neg (NEG)   


 


Urine


Amphetamine/Methamphetamine 


 Pos (NEG) 


 


 





 


Urine Benzodiazepines Screen  Neg (NEG)   


 


Urine Cocaine Screen  Neg (NEG)   


 


Urine Cannabinoids Screen  Neg (NEG)   


 


Urine Ethyl Alcohol  Neg (NEG)   


 


Bedside Urine HCG, Qualitative


 


 


 Hcg negative


(Negative) 





 


Test


 10/22/18


03:20 


 


 





 


White Blood Count


 9.8 x10^3/uL


(4.0-11.0) 


 


 





 


Red Blood Count


 4.42 x10^6/uL


(3.50-5.40) 


 


 





 


Hemoglobin


 11.3 g/dL


(12.0-15.5) 


 


 





 


Hematocrit


 34.5 %


(36.0-47.0) 


 


 





 


Mean Corpuscular Volume 78 fL ()    


 


Mean Corpuscular Hemoglobin 26 pg (25-35)    


 


Mean Corpuscular Hemoglobin


Concent 33 g/dL


(31-37) 


 


 





 


Red Cell Distribution Width


 16.8 %


(11.5-14.5) 


 


 





 


Platelet Count


 227 x10^3/uL


(140-400) 


 


 





 


Neutrophils (%) (Auto) 71 % (31-73)    


 


Lymphocytes (%) (Auto) 17 % (24-48)    


 


Monocytes (%) (Auto) 10 % (0-9)    


 


Eosinophils (%) (Auto) 1 % (0-3)    


 


Basophils (%) (Auto) 1 % (0-3)    


 


Neutrophils # (Auto)


 7.0 x10^3uL


(1.8-7.7) 


 


 





 


Lymphocytes # (Auto)


 1.6 x10^3/uL


(1.0-4.8) 


 


 





 


Monocytes # (Auto)


 1.0 x10^3/uL


(0.0-1.1) 


 


 





 


Eosinophils # (Auto)


 0.1 x10^3/uL


(0.0-0.7) 


 


 





 


Basophils # (Auto)


 0.1 x10^3/uL


(0.0-0.2) 


 


 





 


Sodium Level


 133 mmol/L


(136-145) 


 


 





 


Potassium Level


 4.2 mmol/L


(3.5-5.1) 


 


 





 


Chloride Level


 98 mmol/L


() 


 


 





 


Carbon Dioxide Level


 29 mmol/L


(21-32) 


 


 





 


Anion Gap 6 (6-14)    


 


Blood Urea Nitrogen 5 mg/dL (7-20)    


 


Creatinine


 0.8 mg/dL


(0.6-1.0) 


 


 





 


Estimated GFR


(Cockcroft-Gault) 76.2 


 


 


 





 


Glucose Level


 180 mg/dL


(70-99) 


 


 





 


Calcium Level


 8.4 mg/dL


(8.5-10.1) 


 


 





 


Iron Level


 45 ug/dL


() 


 


 





 


Total Iron Binding Capacity


 496 ug/dL


(250-450) 


 


 





 


Iron Saturation 9 % (15-34)    


 


Ferritin


 19 ng/mL


(8-252) 


 


 





 


Vitamin B12 Level


 631 pg/mL


(247-911) 


 


 














ECHOCARDIOGRAM


ECHOCARDIOGRAM


<Conclusion>


Left ventricle systolic function appears hyperdynamic. The Ejection Fraction is 

>70%.


There is normal LV segmental wall motion.








DATE:     11/30/17 0848





ASSESSMENT/PLAN


ASSESSMENT/PLAN


1.  Chest pain, atypical. Most probably pleuritic in nature as it is worsened 

with deep breathing


2.  Malignant hypertension, POA. Much better controlled with addition of 

Lisinopril and Norvasc.


3.  Abdominal pain/cirrhosis/ Hx of Hep C; mild transaminitis.  as per GI


4.  Depression/anxiety


5.  Hypomagnesemia; replaced.


6.  Tobaccoism


7.  Substance abuse; UDS + meth











Recommendations





Check lipids


Obtain echo to asses LV function/presence of WMA


Add ACE


Discussed/encouraged cessation of tobacco and drugs.


Supportive care.


If echo WNL, may discharge from a CV standpoint.





OTTONIEL MONROE MD 10/22/18 1534:


CARDIAC CONSULT


ASSESSMENT/PLAN


ASSESSMENT/PLAN


Patient seen and examined. Agree with NP's assessment and plan.


Chest pain with atypical features


Myocardial infarction has been ruled out


Blood pressure much better controlled since admission


Check 2-D echo to assess LV function and rule out wall motion abnormalities


We will consider ischemic workup as an outpatient


Thank you for your consultation











MALORIE HINOJOSA Oct 22, 2018 10:17


OTTONIEL MONROE MD Oct 22, 2018 15:34

## 2018-10-22 NOTE — CONS
DATE OF CONSULTATION:  



ATTENDING PHYSICIAN:  Dr. Dasilva.



REASON FOR CONSULTATION:  Dyspnea.



HISTORY OF PRESENT ILLNESS:  This is a 48-year-old female who came into the

hospital with multiple complaints including diffuse generalized pain.  She has a

mild cough.  Had some nausea, vomiting.  The patient has some occasional

shortness of breath and a mild cough.  She had some subjective fever with sweats

at home.  She has been a smoker since 30 years.  She underwent imaging study

including CTA chest, which was reviewed by me.  There was no evidence of

pulmonary embolism.  There were no other pulmonary parenchymal abnormalities

seen.  There was some left ovarian cyst reported and possibility of cirrhosis. 

I have been asked to see her for further evaluation.



PAST MEDICAL HISTORY:  Significant for hypertension and ongoing tobaccoism. 

Suspect underlying COPD.



PAST SURGICAL HISTORY:  .



FAMILY HISTORY:  Hypertension.



SOCIAL HISTORY:  One pack per day for 30 years and still smokes.



REVIEW OF SYSTEMS:  A 12-point system obtained.  Pertinent positives discussed

in my history of present illness, otherwise noncontributory.  All systems that

were negative were reviewed as well.



PHYSICAL EXAMINATION:

VITAL SIGNS:  Reviewed, pulse ox 92% on room air.

NECK:  Supple.

LUNGS:  With diminished breath sounds, no wheezing.

CARDIOVASCULAR:  Regular rate and rhythm.

ABDOMEN:  Soft.

EXTREMITIES:  With no pitting edema.



LABORATORY DATA:  Reviewed.  White cell count 9.8, hemoglobin 11.3 and platelets

are 227.  Toxicology screen positive for meth and she does take marijuana as

well, but it was negative.



IMPRESSION:

1.  Generalized diffuse pain, could be related to methamphetamine withdrawal. 

No evidence of any pulmonary embolism.

2.  30 years of tobaccoism and suspect underlying chronic obstructive pulmonary

disease without exacerbation.

3.  Substance abuse.

4.  Ongoing tobaccoism.



RECOMMENDATION:

1.  From a pulmonary standpoint, continue nebulizer treatments.  

2.  DVT prophylaxis.

3.  Smoking cessation counseling provided.

4.  Counseling regarding substance abuse was provided as well.

5.  No further recommendations at this point.

 



______________________________

JEROME OSEGUERA MD DR:  NOEL/laure  JOB#:  [id number] / [id number]

DD:  10/22/2018 11:03  DT:  10/22/2018 12:06

## 2018-10-22 NOTE — PDOC
PROGRESS NOTES


Chief Complaint


Chief Complaint


Abd pain


Pleurisy likely


Headache, 2/2


HTN urgency


Chronic bloody stool as per pt with mild anemia


h/o hep C 


OCD


ADHD


H/O encephalitis


Tobaccoism


h/o drug abuse with marijuana


Mild malnutrition





History of Present Illness


History of Present Illness


Pt seen and examined with RN


Pt has a very unusual affect


CO abd pain





Vitals


Vitals





Vital Signs








  Date Time  Temp Pulse Resp B/P (MAP) Pulse Ox O2 Delivery O2 Flow Rate FiO2


 


10/22/18 11:54     92 Room Air  


 


10/22/18 11:00 98.2 88 18 14/80 (58)    





 98.2       











Physical Exam


General:  Alert, mild distress


Heart:  Regular rate, Normal S1


Lungs:  Clear


Abdomen:  Normal bowel sounds, Other (tender)


Extremities:  No clubbing, No cyanosis


Skin:  No rashes, No breakdown





Labs


LABS





Laboratory Tests








Test


 10/21/18


14:38 10/21/18


15:07 10/21/18


15:36 10/21/18


22:00


 


White Blood Count


 7.2 x10^3/uL


(4.0-11.0) 


 


 





 


Red Blood Count


 4.29 x10^6/uL


(3.50-5.40) 


 


 





 


Hemoglobin


 11.0 g/dL


(12.0-15.5) 


 


 





 


Hematocrit


 33.3 %


(36.0-47.0) 


 


 





 


Mean Corpuscular Volume 78 fL ()    


 


Mean Corpuscular Hemoglobin 26 pg (25-35)    


 


Mean Corpuscular Hemoglobin


Concent 33 g/dL


(31-37) 


 


 





 


Red Cell Distribution Width


 16.1 %


(11.5-14.5) 


 


 





 


Platelet Count


 202 x10^3/uL


(140-400) 


 


 





 


Neutrophils (%) (Auto) 74 % (31-73)    


 


Lymphocytes (%) (Auto) 17 % (24-48)    


 


Monocytes (%) (Auto) 8 % (0-9)    


 


Eosinophils (%) (Auto) 1 % (0-3)    


 


Basophils (%) (Auto) 0 % (0-3)    


 


Neutrophils # (Auto)


 5.3 x10^3uL


(1.8-7.7) 


 


 





 


Lymphocytes # (Auto)


 1.2 x10^3/uL


(1.0-4.8) 


 


 





 


Monocytes # (Auto)


 0.6 x10^3/uL


(0.0-1.1) 


 


 





 


Eosinophils # (Auto)


 0.1 x10^3/uL


(0.0-0.7) 


 


 





 


Basophils # (Auto)


 0.0 x10^3/uL


(0.0-0.2) 


 


 





 


Sodium Level


 136 mmol/L


(136-145) 


 


 





 


Potassium Level


 3.7 mmol/L


(3.5-5.1) 


 


 





 


Chloride Level


 98 mmol/L


() 


 


 





 


Carbon Dioxide Level


 28 mmol/L


(21-32) 


 


 





 


Anion Gap 10 (6-14)    


 


Blood Urea Nitrogen 7 mg/dL (7-20)    


 


Creatinine


 0.9 mg/dL


(0.6-1.0) 


 


 





 


Estimated GFR


(Cockcroft-Gault) 66.5 


 


 


 





 


BUN/Creatinine Ratio 8 (6-20)    


 


Glucose Level


 216 mg/dL


(70-99) 


 


 





 


Lactic Acid Level


 2.1 mmol/L


(0.4-2.0) 


 


 





 


Calcium Level


 8.7 mg/dL


(8.5-10.1) 


 


 





 


Magnesium Level


 1.7 mg/dL


(1.8-2.4) 


 


 





 


Total Bilirubin


 0.5 mg/dL


(0.2-1.0) 


 


 





 


Aspartate Amino Transf


(AST/SGOT) 60 U/L (15-37) 


 


 


 





 


Alanine Aminotransferase


(ALT/SGPT) 86 U/L (14-59) 


 


 


 





 


Alkaline Phosphatase


 115 U/L


() 


 


 





 


Creatine Kinase


 38 U/L


() 


 


 





 


Creatine Kinase MB (Mass)


 0.9 ng/mL


(0.0-3.6) 


 


 





 


Creatine Kinase MB Relative


Index 2.4 % (0-4) 


 


 


 





 


Troponin I Quantitative


 < 0.017 ng/mL


(0.000-0.055) 


 


 < 0.017 ng/mL


(0.000-0.055)


 


NT-Pro-B-Type Natriuretic


Peptide 187 pg/mL


(0-124) 


 


 





 


Total Protein


 8.1 g/dL


(6.4-8.2) 


 


 





 


Albumin


 3.2 g/dL


(3.4-5.0) 


 


 





 


Albumin/Globulin Ratio 0.7 (1.0-1.7)    


 


Lipase


 138 U/L


() 


 


 





 


Thyroid Stimulating Hormone


(TSH) 1.349 uIU/mL


(0.358-3.74) 


 


 





 


Free Thyroxine


 1.04 ng/dL


(0.76-1.46) 


 


 





 


Influenza Type A Antigen


 Negative


(NEGATIVE) 


 


 





 


Influenza Type B Antigen


 Negative


(NEGATIVE) 


 


 





 


Urine Collection Type  Unknown   


 


Urine Color  Yellow   


 


Urine Clarity  Cloudy   


 


Urine pH  7.0   


 


Urine Specific Gravity  >=1.030   


 


Urine Protein


 


 Negative mg/dL


(NEG-TRACE) 


 





 


Urine Glucose (UA)


 


 >=1000 mg/dL


(NEG) 


 





 


Urine Ketones (Stick)


 


 Negative mg/dL


(NEG) 


 





 


Urine Blood  Negative (NEG)   


 


Urine Nitrite  Negative (NEG)   


 


Urine Bilirubin  Negative (NEG)   


 


Urine Urobilinogen Dipstick


 


 1.0 mg/dL (0.2


mg/dL) 


 





 


Urine Leukocyte Esterase  Negative (NEG)   


 


Urine RBC  0 /HPF (0-2)   


 


Urine WBC  0 /HPF (0-4)   


 


Urine Squamous Epithelial


Cells 


 Mod /LPF 


 


 





 


Urine Bacteria  0 /HPF (0-FEW)   


 


Urine Mucus  Slight /LPF   


 


Urine Opiates Screen  Neg (NEG)   


 


Urine Methadone Screen  Neg (NEG)   


 


Urine Barbiturates  Neg (NEG)   


 


Urine Phencyclidine Screen  Neg (NEG)   


 


Urine


Amphetamine/Methamphetamine 


 Pos (NEG) 


 


 





 


Urine Benzodiazepines Screen  Neg (NEG)   


 


Urine Cocaine Screen  Neg (NEG)   


 


Urine Cannabinoids Screen  Neg (NEG)   


 


Urine Ethyl Alcohol  Neg (NEG)   


 


Bedside Urine HCG, Qualitative


 


 


 Hcg negative


(Negative) 





 


Test


 10/22/18


03:20 


 


 





 


White Blood Count


 9.8 x10^3/uL


(4.0-11.0) 


 


 





 


Red Blood Count


 4.42 x10^6/uL


(3.50-5.40) 


 


 





 


Hemoglobin


 11.3 g/dL


(12.0-15.5) 


 


 





 


Hematocrit


 34.5 %


(36.0-47.0) 


 


 





 


Mean Corpuscular Volume 78 fL ()    


 


Mean Corpuscular Hemoglobin 26 pg (25-35)    


 


Mean Corpuscular Hemoglobin


Concent 33 g/dL


(31-37) 


 


 





 


Red Cell Distribution Width


 16.8 %


(11.5-14.5) 


 


 





 


Platelet Count


 227 x10^3/uL


(140-400) 


 


 





 


Neutrophils (%) (Auto) 71 % (31-73)    


 


Lymphocytes (%) (Auto) 17 % (24-48)    


 


Monocytes (%) (Auto) 10 % (0-9)    


 


Eosinophils (%) (Auto) 1 % (0-3)    


 


Basophils (%) (Auto) 1 % (0-3)    


 


Neutrophils # (Auto)


 7.0 x10^3uL


(1.8-7.7) 


 


 





 


Lymphocytes # (Auto)


 1.6 x10^3/uL


(1.0-4.8) 


 


 





 


Monocytes # (Auto)


 1.0 x10^3/uL


(0.0-1.1) 


 


 





 


Eosinophils # (Auto)


 0.1 x10^3/uL


(0.0-0.7) 


 


 





 


Basophils # (Auto)


 0.1 x10^3/uL


(0.0-0.2) 


 


 





 


Sodium Level


 133 mmol/L


(136-145) 


 


 





 


Potassium Level


 4.2 mmol/L


(3.5-5.1) 


 


 





 


Chloride Level


 98 mmol/L


() 


 


 





 


Carbon Dioxide Level


 29 mmol/L


(21-32) 


 


 





 


Anion Gap 6 (6-14)    


 


Blood Urea Nitrogen 5 mg/dL (7-20)    


 


Creatinine


 0.8 mg/dL


(0.6-1.0) 


 


 





 


Estimated GFR


(Cockcroft-Gault) 76.2 


 


 


 





 


Glucose Level


 180 mg/dL


(70-99) 


 


 





 


Calcium Level


 8.4 mg/dL


(8.5-10.1) 


 


 





 


Iron Level


 45 ug/dL


() 


 


 





 


Total Iron Binding Capacity


 496 ug/dL


(250-450) 


 


 





 


Iron Saturation 9 % (15-34)    


 


Ferritin


 19 ng/mL


(8-252) 


 


 





 


Vitamin B12 Level


 631 pg/mL


(247-911) 


 


 














Review of Systems


Review of Systems


co pain


co nausea





Assessment and Plan


Assessmemt and Plan


Problems


Medical Problems:


(1) Abdominal pain


Status: Acute  





(2) Chest pain


Status: Acute  


Abd pain


Pleurisy likely


Headache, 2/2


HTN urgency


Chronic bloody stool as per pt with mild anemia


h/o hep C 


OCD


ADHD


H/O encephalitis


Tobaccoism


h/o drug abuse with marijuana


Mild malnutrition











Plan:


Abd U/S to r/o gallstones


Labs


Home meds


pulm, gi , card consult


clear liquid diet for now


dvt, gi ppx


Duoneb, abluterol prnAbd pain


PRN narcotics





Comment


Review of Relevant


I have reviewed the following items ta (where applicable) has been applied.


Labs





Laboratory Tests








Test


 10/21/18


14:38 10/21/18


15:07 10/21/18


15:36 10/21/18


22:00


 


White Blood Count


 7.2 x10^3/uL


(4.0-11.0) 


 


 





 


Red Blood Count


 4.29 x10^6/uL


(3.50-5.40) 


 


 





 


Hemoglobin


 11.0 g/dL


(12.0-15.5) 


 


 





 


Hematocrit


 33.3 %


(36.0-47.0) 


 


 





 


Mean Corpuscular Volume 78 fL ()    


 


Mean Corpuscular Hemoglobin 26 pg (25-35)    


 


Mean Corpuscular Hemoglobin


Concent 33 g/dL


(31-37) 


 


 





 


Red Cell Distribution Width


 16.1 %


(11.5-14.5) 


 


 





 


Platelet Count


 202 x10^3/uL


(140-400) 


 


 





 


Neutrophils (%) (Auto) 74 % (31-73)    


 


Lymphocytes (%) (Auto) 17 % (24-48)    


 


Monocytes (%) (Auto) 8 % (0-9)    


 


Eosinophils (%) (Auto) 1 % (0-3)    


 


Basophils (%) (Auto) 0 % (0-3)    


 


Neutrophils # (Auto)


 5.3 x10^3uL


(1.8-7.7) 


 


 





 


Lymphocytes # (Auto)


 1.2 x10^3/uL


(1.0-4.8) 


 


 





 


Monocytes # (Auto)


 0.6 x10^3/uL


(0.0-1.1) 


 


 





 


Eosinophils # (Auto)


 0.1 x10^3/uL


(0.0-0.7) 


 


 





 


Basophils # (Auto)


 0.0 x10^3/uL


(0.0-0.2) 


 


 





 


Sodium Level


 136 mmol/L


(136-145) 


 


 





 


Potassium Level


 3.7 mmol/L


(3.5-5.1) 


 


 





 


Chloride Level


 98 mmol/L


() 


 


 





 


Carbon Dioxide Level


 28 mmol/L


(21-32) 


 


 





 


Anion Gap 10 (6-14)    


 


Blood Urea Nitrogen 7 mg/dL (7-20)    


 


Creatinine


 0.9 mg/dL


(0.6-1.0) 


 


 





 


Estimated GFR


(Cockcroft-Gault) 66.5 


 


 


 





 


BUN/Creatinine Ratio 8 (6-20)    


 


Glucose Level


 216 mg/dL


(70-99) 


 


 





 


Lactic Acid Level


 2.1 mmol/L


(0.4-2.0) 


 


 





 


Calcium Level


 8.7 mg/dL


(8.5-10.1) 


 


 





 


Magnesium Level


 1.7 mg/dL


(1.8-2.4) 


 


 





 


Total Bilirubin


 0.5 mg/dL


(0.2-1.0) 


 


 





 


Aspartate Amino Transf


(AST/SGOT) 60 U/L (15-37) 


 


 


 





 


Alanine Aminotransferase


(ALT/SGPT) 86 U/L (14-59) 


 


 


 





 


Alkaline Phosphatase


 115 U/L


() 


 


 





 


Creatine Kinase


 38 U/L


() 


 


 





 


Creatine Kinase MB (Mass)


 0.9 ng/mL


(0.0-3.6) 


 


 





 


Creatine Kinase MB Relative


Index 2.4 % (0-4) 


 


 


 





 


Troponin I Quantitative


 < 0.017 ng/mL


(0.000-0.055) 


 


 < 0.017 ng/mL


(0.000-0.055)


 


NT-Pro-B-Type Natriuretic


Peptide 187 pg/mL


(0-124) 


 


 





 


Total Protein


 8.1 g/dL


(6.4-8.2) 


 


 





 


Albumin


 3.2 g/dL


(3.4-5.0) 


 


 





 


Albumin/Globulin Ratio 0.7 (1.0-1.7)    


 


Lipase


 138 U/L


() 


 


 





 


Thyroid Stimulating Hormone


(TSH) 1.349 uIU/mL


(0.358-3.74) 


 


 





 


Free Thyroxine


 1.04 ng/dL


(0.76-1.46) 


 


 





 


Influenza Type A Antigen


 Negative


(NEGATIVE) 


 


 





 


Influenza Type B Antigen


 Negative


(NEGATIVE) 


 


 





 


Urine Collection Type  Unknown   


 


Urine Color  Yellow   


 


Urine Clarity  Cloudy   


 


Urine pH  7.0   


 


Urine Specific Gravity  >=1.030   


 


Urine Protein


 


 Negative mg/dL


(NEG-TRACE) 


 





 


Urine Glucose (UA)


 


 >=1000 mg/dL


(NEG) 


 





 


Urine Ketones (Stick)


 


 Negative mg/dL


(NEG) 


 





 


Urine Blood  Negative (NEG)   


 


Urine Nitrite  Negative (NEG)   


 


Urine Bilirubin  Negative (NEG)   


 


Urine Urobilinogen Dipstick


 


 1.0 mg/dL (0.2


mg/dL) 


 





 


Urine Leukocyte Esterase  Negative (NEG)   


 


Urine RBC  0 /HPF (0-2)   


 


Urine WBC  0 /HPF (0-4)   


 


Urine Squamous Epithelial


Cells 


 Mod /LPF 


 


 





 


Urine Bacteria  0 /HPF (0-FEW)   


 


Urine Mucus  Slight /LPF   


 


Urine Opiates Screen  Neg (NEG)   


 


Urine Methadone Screen  Neg (NEG)   


 


Urine Barbiturates  Neg (NEG)   


 


Urine Phencyclidine Screen  Neg (NEG)   


 


Urine


Amphetamine/Methamphetamine 


 Pos (NEG) 


 


 





 


Urine Benzodiazepines Screen  Neg (NEG)   


 


Urine Cocaine Screen  Neg (NEG)   


 


Urine Cannabinoids Screen  Neg (NEG)   


 


Urine Ethyl Alcohol  Neg (NEG)   


 


Bedside Urine HCG, Qualitative


 


 


 Hcg negative


(Negative) 





 


Test


 10/22/18


03:20 


 


 





 


White Blood Count


 9.8 x10^3/uL


(4.0-11.0) 


 


 





 


Red Blood Count


 4.42 x10^6/uL


(3.50-5.40) 


 


 





 


Hemoglobin


 11.3 g/dL


(12.0-15.5) 


 


 





 


Hematocrit


 34.5 %


(36.0-47.0) 


 


 





 


Mean Corpuscular Volume 78 fL ()    


 


Mean Corpuscular Hemoglobin 26 pg (25-35)    


 


Mean Corpuscular Hemoglobin


Concent 33 g/dL


(31-37) 


 


 





 


Red Cell Distribution Width


 16.8 %


(11.5-14.5) 


 


 





 


Platelet Count


 227 x10^3/uL


(140-400) 


 


 





 


Neutrophils (%) (Auto) 71 % (31-73)    


 


Lymphocytes (%) (Auto) 17 % (24-48)    


 


Monocytes (%) (Auto) 10 % (0-9)    


 


Eosinophils (%) (Auto) 1 % (0-3)    


 


Basophils (%) (Auto) 1 % (0-3)    


 


Neutrophils # (Auto)


 7.0 x10^3uL


(1.8-7.7) 


 


 





 


Lymphocytes # (Auto)


 1.6 x10^3/uL


(1.0-4.8) 


 


 





 


Monocytes # (Auto)


 1.0 x10^3/uL


(0.0-1.1) 


 


 





 


Eosinophils # (Auto)


 0.1 x10^3/uL


(0.0-0.7) 


 


 





 


Basophils # (Auto)


 0.1 x10^3/uL


(0.0-0.2) 


 


 





 


Sodium Level


 133 mmol/L


(136-145) 


 


 





 


Potassium Level


 4.2 mmol/L


(3.5-5.1) 


 


 





 


Chloride Level


 98 mmol/L


() 


 


 





 


Carbon Dioxide Level


 29 mmol/L


(21-32) 


 


 





 


Anion Gap 6 (6-14)    


 


Blood Urea Nitrogen 5 mg/dL (7-20)    


 


Creatinine


 0.8 mg/dL


(0.6-1.0) 


 


 





 


Estimated GFR


(Cockcroft-Gault) 76.2 


 


 


 





 


Glucose Level


 180 mg/dL


(70-99) 


 


 





 


Calcium Level


 8.4 mg/dL


(8.5-10.1) 


 


 





 


Iron Level


 45 ug/dL


() 


 


 





 


Total Iron Binding Capacity


 496 ug/dL


(250-450) 


 


 





 


Iron Saturation 9 % (15-34)    


 


Ferritin


 19 ng/mL


(8-252) 


 


 





 


Vitamin B12 Level


 631 pg/mL


(247-911) 


 


 











Laboratory Tests








Test


 10/21/18


14:38 10/21/18


15:07 10/21/18


15:36 10/21/18


22:00


 


White Blood Count


 7.2 x10^3/uL


(4.0-11.0) 


 


 





 


Red Blood Count


 4.29 x10^6/uL


(3.50-5.40) 


 


 





 


Hemoglobin


 11.0 g/dL


(12.0-15.5) 


 


 





 


Hematocrit


 33.3 %


(36.0-47.0) 


 


 





 


Mean Corpuscular Volume 78 fL ()    


 


Mean Corpuscular Hemoglobin 26 pg (25-35)    


 


Mean Corpuscular Hemoglobin


Concent 33 g/dL


(31-37) 


 


 





 


Red Cell Distribution Width


 16.1 %


(11.5-14.5) 


 


 





 


Platelet Count


 202 x10^3/uL


(140-400) 


 


 





 


Neutrophils (%) (Auto) 74 % (31-73)    


 


Lymphocytes (%) (Auto) 17 % (24-48)    


 


Monocytes (%) (Auto) 8 % (0-9)    


 


Eosinophils (%) (Auto) 1 % (0-3)    


 


Basophils (%) (Auto) 0 % (0-3)    


 


Neutrophils # (Auto)


 5.3 x10^3uL


(1.8-7.7) 


 


 





 


Lymphocytes # (Auto)


 1.2 x10^3/uL


(1.0-4.8) 


 


 





 


Monocytes # (Auto)


 0.6 x10^3/uL


(0.0-1.1) 


 


 





 


Eosinophils # (Auto)


 0.1 x10^3/uL


(0.0-0.7) 


 


 





 


Basophils # (Auto)


 0.0 x10^3/uL


(0.0-0.2) 


 


 





 


Sodium Level


 136 mmol/L


(136-145) 


 


 





 


Potassium Level


 3.7 mmol/L


(3.5-5.1) 


 


 





 


Chloride Level


 98 mmol/L


() 


 


 





 


Carbon Dioxide Level


 28 mmol/L


(21-32) 


 


 





 


Anion Gap 10 (6-14)    


 


Blood Urea Nitrogen 7 mg/dL (7-20)    


 


Creatinine


 0.9 mg/dL


(0.6-1.0) 


 


 





 


Estimated GFR


(Cockcroft-Gault) 66.5 


 


 


 





 


BUN/Creatinine Ratio 8 (6-20)    


 


Glucose Level


 216 mg/dL


(70-99) 


 


 





 


Lactic Acid Level


 2.1 mmol/L


(0.4-2.0) 


 


 





 


Calcium Level


 8.7 mg/dL


(8.5-10.1) 


 


 





 


Magnesium Level


 1.7 mg/dL


(1.8-2.4) 


 


 





 


Total Bilirubin


 0.5 mg/dL


(0.2-1.0) 


 


 





 


Aspartate Amino Transf


(AST/SGOT) 60 U/L (15-37) 


 


 


 





 


Alanine Aminotransferase


(ALT/SGPT) 86 U/L (14-59) 


 


 


 





 


Alkaline Phosphatase


 115 U/L


() 


 


 





 


Creatine Kinase


 38 U/L


() 


 


 





 


Creatine Kinase MB (Mass)


 0.9 ng/mL


(0.0-3.6) 


 


 





 


Creatine Kinase MB Relative


Index 2.4 % (0-4) 


 


 


 





 


Troponin I Quantitative


 < 0.017 ng/mL


(0.000-0.055) 


 


 < 0.017 ng/mL


(0.000-0.055)


 


NT-Pro-B-Type Natriuretic


Peptide 187 pg/mL


(0-124) 


 


 





 


Total Protein


 8.1 g/dL


(6.4-8.2) 


 


 





 


Albumin


 3.2 g/dL


(3.4-5.0) 


 


 





 


Albumin/Globulin Ratio 0.7 (1.0-1.7)    


 


Lipase


 138 U/L


() 


 


 





 


Thyroid Stimulating Hormone


(TSH) 1.349 uIU/mL


(0.358-3.74) 


 


 





 


Free Thyroxine


 1.04 ng/dL


(0.76-1.46) 


 


 





 


Influenza Type A Antigen


 Negative


(NEGATIVE) 


 


 





 


Influenza Type B Antigen


 Negative


(NEGATIVE) 


 


 





 


Urine Collection Type  Unknown   


 


Urine Color  Yellow   


 


Urine Clarity  Cloudy   


 


Urine pH  7.0   


 


Urine Specific Gravity  >=1.030   


 


Urine Protein


 


 Negative mg/dL


(NEG-TRACE) 


 





 


Urine Glucose (UA)


 


 >=1000 mg/dL


(NEG) 


 





 


Urine Ketones (Stick)


 


 Negative mg/dL


(NEG) 


 





 


Urine Blood  Negative (NEG)   


 


Urine Nitrite  Negative (NEG)   


 


Urine Bilirubin  Negative (NEG)   


 


Urine Urobilinogen Dipstick


 


 1.0 mg/dL (0.2


mg/dL) 


 





 


Urine Leukocyte Esterase  Negative (NEG)   


 


Urine RBC  0 /HPF (0-2)   


 


Urine WBC  0 /HPF (0-4)   


 


Urine Squamous Epithelial


Cells 


 Mod /LPF 


 


 





 


Urine Bacteria  0 /HPF (0-FEW)   


 


Urine Mucus  Slight /LPF   


 


Urine Opiates Screen  Neg (NEG)   


 


Urine Methadone Screen  Neg (NEG)   


 


Urine Barbiturates  Neg (NEG)   


 


Urine Phencyclidine Screen  Neg (NEG)   


 


Urine


Amphetamine/Methamphetamine 


 Pos (NEG) 


 


 





 


Urine Benzodiazepines Screen  Neg (NEG)   


 


Urine Cocaine Screen  Neg (NEG)   


 


Urine Cannabinoids Screen  Neg (NEG)   


 


Urine Ethyl Alcohol  Neg (NEG)   


 


Bedside Urine HCG, Qualitative


 


 


 Hcg negative


(Negative) 





 


Test


 10/22/18


03:20 


 


 





 


White Blood Count


 9.8 x10^3/uL


(4.0-11.0) 


 


 





 


Red Blood Count


 4.42 x10^6/uL


(3.50-5.40) 


 


 





 


Hemoglobin


 11.3 g/dL


(12.0-15.5) 


 


 





 


Hematocrit


 34.5 %


(36.0-47.0) 


 


 





 


Mean Corpuscular Volume 78 fL ()    


 


Mean Corpuscular Hemoglobin 26 pg (25-35)    


 


Mean Corpuscular Hemoglobin


Concent 33 g/dL


(31-37) 


 


 





 


Red Cell Distribution Width


 16.8 %


(11.5-14.5) 


 


 





 


Platelet Count


 227 x10^3/uL


(140-400) 


 


 





 


Neutrophils (%) (Auto) 71 % (31-73)    


 


Lymphocytes (%) (Auto) 17 % (24-48)    


 


Monocytes (%) (Auto) 10 % (0-9)    


 


Eosinophils (%) (Auto) 1 % (0-3)    


 


Basophils (%) (Auto) 1 % (0-3)    


 


Neutrophils # (Auto)


 7.0 x10^3uL


(1.8-7.7) 


 


 





 


Lymphocytes # (Auto)


 1.6 x10^3/uL


(1.0-4.8) 


 


 





 


Monocytes # (Auto)


 1.0 x10^3/uL


(0.0-1.1) 


 


 





 


Eosinophils # (Auto)


 0.1 x10^3/uL


(0.0-0.7) 


 


 





 


Basophils # (Auto)


 0.1 x10^3/uL


(0.0-0.2) 


 


 





 


Sodium Level


 133 mmol/L


(136-145) 


 


 





 


Potassium Level


 4.2 mmol/L


(3.5-5.1) 


 


 





 


Chloride Level


 98 mmol/L


() 


 


 





 


Carbon Dioxide Level


 29 mmol/L


(21-32) 


 


 





 


Anion Gap 6 (6-14)    


 


Blood Urea Nitrogen 5 mg/dL (7-20)    


 


Creatinine


 0.8 mg/dL


(0.6-1.0) 


 


 





 


Estimated GFR


(Cockcroft-Gault) 76.2 


 


 


 





 


Glucose Level


 180 mg/dL


(70-99) 


 


 





 


Calcium Level


 8.4 mg/dL


(8.5-10.1) 


 


 





 


Iron Level


 45 ug/dL


() 


 


 





 


Total Iron Binding Capacity


 496 ug/dL


(250-450) 


 


 





 


Iron Saturation 9 % (15-34)    


 


Ferritin


 19 ng/mL


(8-252) 


 


 





 


Vitamin B12 Level


 631 pg/mL


(247-911) 


 


 











Medications





Current Medications


Morphine Sulfate (Morphine Sulfate) 4 mg PRN Q15MIN  PRN IV/SQ PAIN GREATER 

THAN 3/10 Last administered on 10/21/18at 17:11;  Start 10/21/18 at 14:30;  

Stop 10/22/18 at 14:29


Labetalol HCl (Normodyne Iv Push) 20 mg 1X  ONCE IVP  Last administered on 10/21

/18at 14:57;  Start 10/21/18 at 14:30;  Stop 10/21/18 at 14:31;  Status DC


Nitroglycerin (Nitro-Bid Oint) 1 inch 1X  ONCE TP  Last administered on 10/21/

18at 14:56;  Start 10/21/18 at 14:30;  Stop 10/21/18 at 14:31;  Status DC


Iohexol (Omnipaque 300 Mg/ml) 75 ml 1X  ONCE IV  Last administered on 10/21/

18at 16:00;  Start 10/21/18 at 14:45;  Stop 10/21/18 at 14:46;  Status DC


Info (CONTRAST GIVEN -- Rx MONITORING) 1 each PRN DAILY  PRN MC SEE COMMENTS;  

Start 10/21/18 at 14:45;  Stop 10/23/18 at 14:44


Acetaminophen (Tylenol) 650 mg PRN Q6HRS  PRN PO FEVER;  Start 10/21/18 at 16:30


Ondansetron HCl (Zofran) 4 mg PRN Q6HRS  PRN IV NAUSEA/VOMITING;  Start 10/21/

18 at 16:30


Morphine Sulfate (Morphine Sulfate) 2 mg PRN Q2HR  PRN IV MODERATE TO SEVERE 

PAIN Last administered on 10/22/18at 01:33;  Start 10/21/18 at 16:30


Tramadol HCl (Ultram) 50 mg PRN Q6HRS  PRN PO MILD TO MODERATE PAIN Last 

administered on 10/21/18at 23:37;  Start 10/21/18 at 16:30


Docusate Sodium (Colace) 100 mg PRN DAILY  PRN PO CONSTIPATION;  Start 10/21/18 

at 16:30


Labetalol HCl (Normodyne Iv Push) 20 mg PRN Q2HR  PRN IVP HYPERTENSION, SEE 

COMMENTS;  Start 10/21/18 at 16:30


Lisinopril (Prinivil) 40 mg DAILY PO  Last administered on 10/22/18at 09:10;  

Start 10/21/18 at 17:00


Amlodipine Besylate (Norvasc) 10 mg DAILY PO  Last administered on 10/22/18at 09

:10;  Start 10/21/18 at 17:00


Sodium Chloride 1,000 ml @  100 mls/hr Q10H IV  Last administered on 10/22/18at 

12:26;  Start 10/21/18 at 16:30


Magnesium Sulfate 50 ml @ 25 mls/hr 1X  ONCE IV  Last administered on 10/21/

18at 21:05;  Start 10/21/18 at 16:45;  Stop 10/21/18 at 18:44;  Status DC


Pantoprazole Sodium (Protonix) 40 mg DAILYAC PO  Last administered on 10/22/

18at 07:36;  Start 10/22/18 at 07:30


Enoxaparin Sodium (Lovenox 40mg Syringe) 40 mg Q24H SQ  Last administered on 10/

21/18at 21:12;  Start 10/21/18 at 21:00


Albuterol/ Ipratropium (Duoneb) 3 ml RTQID NEB  Last administered on 10/22/18at 

11:53;  Start 10/21/18 at 16:30


Albuterol Sulfate (Ventolin Neb Soln) 2.5 mg PRN Q2HR  PRN NEB SHORTNESS OF 

BREATH;  Start 10/21/18 at 16:45


Guaifenesin (Mucinex) 600 mg BID PO  Last administered on 10/22/18at 09:09;  

Start 10/21/18 at 21:00


Nicotine (Nicoderm Cq 21mg) 1 patch PRN DAILY  PRN TD SMOKING CESSATION;  Start 

10/21/18 at 16:45;  Stop 10/21/18 at 18:17;  Status DC


Nicotine (Nicoderm Cq 21mg) 1 patch DAILY TD  Last administered on 10/22/18at 09

:11;  Start 10/21/18 at 18:30


Fentanyl Citrate (Fentanyl 2ml Vial) 50 mcg PRN Q2HR  PRN IV PAIN Last 

administered on 10/22/18at 10:29;  Start 10/22/18 at 02:30





Active Scripts


Active


Oxycodone-Acetaminophen 5-325 (Oxycodone Hcl/Acetaminophen) 1 Each Tablet 1 Tab 

PO PRN Q6HRS PRN


Lisinopril 20 Mg Tablet 40 Mg PO DAILY


Vitals/I & O





Vital Sign - Last 24 Hours








 10/21/18 10/21/18 10/21/18 10/21/18





 14:11 14:30 14:56 14:57


 


Temp 99.6   





 99.6   


 


Pulse 115 110 112 112


 


Resp 20 20  


 


B/P (MAP) 228/110 (149) 233/100 (144) 216/95 216/95


 


Pulse Ox 98 99  


 


O2 Delivery Room Air Room Air  


 


    





    





 10/21/18 10/21/18 10/21/18 10/21/18





 15:00 15:30 16:00 17:11


 


Pulse 102 96 95 


 


Resp 20 22 18 20


 


B/P (MAP) 210/98 (135) 180/88 (118) 172/88 (116) 


 


Pulse Ox 98 98 99 99


 


O2 Delivery Room Air Room Air Room Air Room Air





 10/21/18 10/21/18 10/21/18 10/21/18





 17:47 19:00 19:00 19:59


 


Temp   98.8 





   98.8 


 


Pulse   105 


 


Resp   18 


 


B/P (MAP)   162/87 (112) 


 


Pulse Ox  97 95 97


 


O2 Delivery Room Air Room Air Room Air Room Air


 


    





    





 10/21/18 10/21/18 10/21/18 10/21/18





 20:10 21:03 21:04 23:00


 


Temp    98.1





    98.1


 


Pulse  105 105 90


 


Resp    20


 


B/P (MAP)  162/87 162/87 160/93 (115)


 


Pulse Ox    94


 


O2 Delivery Room Air   Room Air


 


    





    





 10/22/18 10/22/18 10/22/18 10/22/18





 00:45 01:33 02:29 03:00


 


Temp    98.7





    98.7


 


Pulse    80


 


Resp    20


 


B/P (MAP)    162/84 (110)


 


Pulse Ox    93


 


O2 Delivery Room Air Room Air Room Air Room Air


 


    





    





 10/22/18 10/22/18 10/22/18 10/22/18





 03:33 04:32 05:40 07:00


 


Temp    98.9





    98.9


 


Pulse    91


 


Resp    20


 


B/P (MAP)    135/73 (93)


 


Pulse Ox  94  93


 


O2 Delivery Room Air  Room Air Room Air


 


    





    





 10/22/18 10/22/18 10/22/18 10/22/18





 07:15 07:41 08:00 09:10


 


Pulse    91


 


Resp  20  


 


B/P (MAP)    135/73


 


Pulse Ox 92   


 


O2 Delivery Room Air Room Air Room Air 





 10/22/18 10/22/18 10/22/18 10/22/18





 09:10 10:29 10:59 11:00


 


Temp    98.2





    98.2


 


Pulse 91   88


 


Resp  20 18 18


 


B/P (MAP) 135/73   14/80 (58)


 


Pulse Ox    96


 


O2 Delivery  Room Air Room Air Room Air


 


    





    





 10/22/18   





 11:54   


 


Pulse Ox 92   


 


O2 Delivery Room Air   














Intake and Output   


 


 10/21/18 10/21/18 10/22/18





 15:00 23:00 07:00


 


Intake Total   2360 ml


 


Balance   2360 ml

















KARIME CALVILLO III DO Oct 22, 2018 13:14

## 2018-10-22 NOTE — CARD
MR#: [id number]

Account#: [id number]

Accession#: [id number]

Date of Study: 10/22/2018

Ordering Physician: MALORIE HINOJOSA, 

Referring Physician: SHANNAN MCKINNEY 

Tech: Kayli De La Cruz RDCS





--------------- APPROVED REPORT --------------





EXAM: Two-dimensional and M-mode echocardiogram with Doppler and color Doppler.



Other Information 

Quality : Good



INDICATION

Chest Pain 



2D DIMENSIONS 

RVDd2.5 (2.9-3.5cm)Left Atrium(2D)3.8 (1.6-4.0cm)

IVSd1.6 (0.7-1.1cm)Aortic Root(2D)2.9 (2.0-3.7cm)

LVDd4.1 (3.9-5.9cm)LVOT Diameter2.0 (1.8-2.4cm)

PWd1.2 (0.7-1.1cm)LVDs2.8 (2.5-4.0cm)

FS (%) 31.9 %SV44.8 ml

LVEF(%)60.5 (>50%)



Aortic Valve

AoV Peak Francois.155.9cm/sAoV VTI24.7cm

AO Peak GR.9.7mmHgLVOT  VTI 22.91cm

AO Mean GR.6mmHgAVA   (VTI)2.85cm2



Mitral Valve

MV E Nuzlkdsa046.9cm/sMV DECEL [id number]

MV A Lzarqteo880.2cm/sE/A  Ratio1.1



TDI

Lateral E' P. V4.89cm/sMedial E' P. V3.73cm/s

E/Lateral E'24.1E/Medial E'31.6



Tricuspid Valve

TR P. Iamqodjt741yy/sRAP LSFZNYZR9roLg

TR Peak Gr.89inWlRLRW23hoDm



Pulmonary Vein

S1 Cmuzrglz87.7cm/sS2 Kndjkffc53.28cm/s

D2 Cfnbidwg70.3cm/s



 LEFT VENTRICLE 

The left ventricle is normal size. There is mild to moderate concentric left ventricular hypertrophy.
 The left ventricular systolic function is normal. The Ejection Fraction is 60-65%. There is normal L
V segmental wall motion. The left ventricular diastolic function and filling is normal for age.



 RIGHT VENTRICLE 

The right ventricle is normal size. The right ventricular systolic function is normal.



 ATRIA 

The left atrium size is normal. The right atrium size is normal. The interatrial septum is intact wit
h no evidence for an atrial septal defect or patent foramen ovale as noted on 2-D or Doppler imaging.




 AORTIC VALVE 

The aortic valve is not well visualized but appears to be functioning normally by Doppler interrogati
on. Doppler and Color Flow revealed no significant aortic regurgitation. There is no significant aort
ic valvular stenosis.



 MITRAL VALVE 

The mitral valve is calcified but opens well. There is no evidence of mitral valve prolapse. There is
 no mitral valve stenosis. Doppler and Color-flow revealed trace mitral regurgitation.



 TRICUSPID VALVE 

The tricuspid valve is normal in structure and function. Doppler and Color Flow revealed mild tricusp
id regurgitation. There is mild-moderate pulmonary hypertension. The PA pressure was estimated at 40 
mmHg. There is no tricuspid valve stenosis.



 PULMONIC VALVE 

The pulmonic valve is not well visualized. Doppler and Color Flow revealed no pulmonic valvular regur
gitation. There is no pulmonic valvular stenosis.



 GREAT VESSELS 

The aortic root is normal in size. The ascending aorta is not well seen. The IVC is normal in size an
d collapses >50% with inspiration.



 PERICARDIAL EFFUSION 

There is no evidence of significant pericardial effusion.



Critical Notification

Critical Value: No



<Conclusion>

The left ventricular systolic function is normal.

The Ejection Fraction is 60-65%.

There is normal LV segmental wall motion.

Trace mitral regurgitation.

Mild tricuspid regurgitation.

The PA pressure was estimated at 40 mmHg.

There is no evidence of significant pericardial effusion.



Signed by : Mehul Walter, 

Electronically Approved : 10/22/2018 16:46:57

## 2018-10-23 NOTE — RAD
Right upper quadrant abdominal ultrasound, 10/22/2018:

 

HISTORY: Right upper quadrant pain

 

The gallbladder is within normal limits in size. There is no sonographic 

evidence of cholelithiasis. The gallbladder wall is not thickened. The 

common hepatic duct is of normal caliber. The liver is mildly enlarged 

measuring 19 cm in craniocaudad extent at the level the right lobe. There 

is no evidence of a hepatic mass. The visualized portions of the pancreas 

and right kidney are unremarkable.

 

IMPRESSION:

1. Mild hepatomegaly.

2. No gallbladder abnormality is detected.

 

Electronically signed by: Rick Moritz, MD (10/23/2018 7:48 AM) [facility]

## 2018-10-23 NOTE — PDOC
G I PROGRESS NOTE


Subjective


Primarily right-sided pain.  From neck to hip with headache and "pleuritic" 

pain.


Physical Exam


Lungs clear.


RRR


Abdomen soft, not distended.  Seems rectus muscular tenderness.  Right costal 

margin tenderness.  Muscular right-sided tenderness.


Review of Relevant


I have reviewed the following items ta (where applicable) has been applied.


Labs





Laboratory Tests








Test


 10/21/18


14:38 10/21/18


15:07 10/21/18


15:36 10/21/18


16:26


 


White Blood Count


 7.2 x10^3/uL


(4.0-11.0) 


 


 





 


Red Blood Count


 4.29 x10^6/uL


(3.50-5.40) 


 


 





 


Hemoglobin


 11.0 g/dL


(12.0-15.5) 


 


 





 


Hematocrit


 33.3 %


(36.0-47.0) 


 


 





 


Mean Corpuscular Volume 78 fL ()    


 


Mean Corpuscular Hemoglobin 26 pg (25-35)    


 


Mean Corpuscular Hemoglobin


Concent 33 g/dL


(31-37) 


 


 





 


Red Cell Distribution Width


 16.1 %


(11.5-14.5) 


 


 





 


Platelet Count


 202 x10^3/uL


(140-400) 


 


 





 


Neutrophils (%) (Auto) 74 % (31-73)    


 


Lymphocytes (%) (Auto) 17 % (24-48)    


 


Monocytes (%) (Auto) 8 % (0-9)    


 


Eosinophils (%) (Auto) 1 % (0-3)    


 


Basophils (%) (Auto) 0 % (0-3)    


 


Neutrophils # (Auto)


 5.3 x10^3uL


(1.8-7.7) 


 


 





 


Lymphocytes # (Auto)


 1.2 x10^3/uL


(1.0-4.8) 


 


 





 


Monocytes # (Auto)


 0.6 x10^3/uL


(0.0-1.1) 


 


 





 


Eosinophils # (Auto)


 0.1 x10^3/uL


(0.0-0.7) 


 


 





 


Basophils # (Auto)


 0.0 x10^3/uL


(0.0-0.2) 


 


 





 


Sodium Level


 136 mmol/L


(136-145) 


 


 





 


Potassium Level


 3.7 mmol/L


(3.5-5.1) 


 


 





 


Chloride Level


 98 mmol/L


() 


 


 





 


Carbon Dioxide Level


 28 mmol/L


(21-32) 


 


 





 


Anion Gap 10 (6-14)    


 


Blood Urea Nitrogen 7 mg/dL (7-20)    


 


Creatinine


 0.9 mg/dL


(0.6-1.0) 


 


 





 


Estimated GFR


(Cockcroft-Gault) 66.5 


 


 


 





 


BUN/Creatinine Ratio 8 (6-20)    


 


Glucose Level


 216 mg/dL


(70-99) 


 


 





 


Hemoglobin A1c


 6.0 %


(4.8-5.6) 


 


 





 


Lactic Acid Level


 2.1 mmol/L


(0.4-2.0) 


 


 





 


Calcium Level


 8.7 mg/dL


(8.5-10.1) 


 


 





 


Magnesium Level


 1.7 mg/dL


(1.8-2.4) 


 


 





 


Total Bilirubin


 0.5 mg/dL


(0.2-1.0) 


 


 





 


Aspartate Amino Transf


(AST/SGOT) 60 U/L (15-37) 


 


 


 





 


Alanine Aminotransferase


(ALT/SGPT) 86 U/L (14-59) 


 


 


 





 


Alkaline Phosphatase


 115 U/L


() 


 


 





 


Creatine Kinase


 38 U/L


() 


 


 





 


Creatine Kinase MB (Mass)


 0.9 ng/mL


(0.0-3.6) 


 


 





 


Creatine Kinase MB Relative


Index 2.4 % (0-4) 


 


 


 





 


Troponin I Quantitative


 < 0.017 ng/mL


(0.000-0.055) 


 


 





 


NT-Pro-B-Type Natriuretic


Peptide 187 pg/mL


(0-124) 


 


 





 


Total Protein


 8.1 g/dL


(6.4-8.2) 


 


 





 


Albumin


 3.2 g/dL


(3.4-5.0) 


 


 





 


Albumin/Globulin Ratio 0.7 (1.0-1.7)    


 


Lipase


 138 U/L


() 


 


 





 


Thyroid Stimulating Hormone


(TSH) 1.349 uIU/mL


(0.358-3.74) 


 


 





 


Free Thyroxine


 1.04 ng/dL


(0.76-1.46) 


 


 





 


Influenza Type A Antigen


 Negative


(NEGATIVE) 


 


 





 


Influenza Type B Antigen


 Negative


(NEGATIVE) 


 


 





 


Urine Collection Type  Unknown   


 


Urine Color  Yellow   


 


Urine Clarity  Cloudy   


 


Urine pH  7.0   


 


Urine Specific Gravity  >=1.030   


 


Urine Protein


 


 Negative mg/dL


(NEG-TRACE) 


 





 


Urine Glucose (UA)


 


 >=1000 mg/dL


(NEG) 


 





 


Urine Ketones (Stick)


 


 Negative mg/dL


(NEG) 


 





 


Urine Blood  Negative (NEG)   


 


Urine Nitrite  Negative (NEG)   


 


Urine Bilirubin  Negative (NEG)   


 


Urine Urobilinogen Dipstick


 


 1.0 mg/dL (0.2


mg/dL) 


 





 


Urine Leukocyte Esterase  Negative (NEG)   


 


Urine RBC  0 /HPF (0-2)   


 


Urine WBC  0 /HPF (0-4)   


 


Urine Squamous Epithelial


Cells 


 Mod /LPF 


 


 





 


Urine Bacteria  0 /HPF (0-FEW)   


 


Urine Mucus  Slight /LPF   


 


Urine Opiates Screen  Neg (NEG)   


 


Urine Methadone Screen  Neg (NEG)   


 


Urine Barbiturates  Neg (NEG)   


 


Urine Phencyclidine Screen  Neg (NEG)   


 


Urine


Amphetamine/Methamphetamine 


 Pos (NEG) 


 


 





 


Urine Benzodiazepines Screen  Neg (NEG)   


 


Urine Cocaine Screen  Neg (NEG)   


 


Urine Cannabinoids Screen  Neg (NEG)   


 


Urine Ethyl Alcohol  Neg (NEG)   


 


Bedside Urine HCG, Qualitative


 


 


 Hcg negative


(Negative) 





 


Miscellaneous Test    Comment (.) 


 


Test


 10/21/18


22:00 10/22/18


03:20 


 





 


Troponin I Quantitative


 < 0.017 ng/mL


(0.000-0.055) 


 


 





 


White Blood Count


 


 9.8 x10^3/uL


(4.0-11.0) 


 





 


Red Blood Count


 


 4.42 x10^6/uL


(3.50-5.40) 


 





 


Hemoglobin


 


 11.3 g/dL


(12.0-15.5) 


 





 


Hematocrit


 


 34.5 %


(36.0-47.0) 


 





 


Mean Corpuscular Volume  78 fL ()   


 


Mean Corpuscular Hemoglobin  26 pg (25-35)   


 


Mean Corpuscular Hemoglobin


Concent 


 33 g/dL


(31-37) 


 





 


Red Cell Distribution Width


 


 16.8 %


(11.5-14.5) 


 





 


Platelet Count


 


 227 x10^3/uL


(140-400) 


 





 


Neutrophils (%) (Auto)  71 % (31-73)   


 


Lymphocytes (%) (Auto)  17 % (24-48)   


 


Monocytes (%) (Auto)  10 % (0-9)   


 


Eosinophils (%) (Auto)  1 % (0-3)   


 


Basophils (%) (Auto)  1 % (0-3)   


 


Neutrophils # (Auto)


 


 7.0 x10^3uL


(1.8-7.7) 


 





 


Lymphocytes # (Auto)


 


 1.6 x10^3/uL


(1.0-4.8) 


 





 


Monocytes # (Auto)


 


 1.0 x10^3/uL


(0.0-1.1) 


 





 


Eosinophils # (Auto)


 


 0.1 x10^3/uL


(0.0-0.7) 


 





 


Basophils # (Auto)


 


 0.1 x10^3/uL


(0.0-0.2) 


 





 


Sodium Level


 


 133 mmol/L


(136-145) 


 





 


Potassium Level


 


 4.2 mmol/L


(3.5-5.1) 


 





 


Chloride Level


 


 98 mmol/L


() 


 





 


Carbon Dioxide Level


 


 29 mmol/L


(21-32) 


 





 


Anion Gap  6 (6-14)   


 


Blood Urea Nitrogen  5 mg/dL (7-20)   


 


Creatinine


 


 0.8 mg/dL


(0.6-1.0) 


 





 


Estimated GFR


(Cockcroft-Gault) 


 76.2 


 


 





 


Glucose Level


 


 180 mg/dL


(70-99) 


 





 


Calcium Level


 


 8.4 mg/dL


(8.5-10.1) 


 





 


Iron Level


 


 45 ug/dL


() 


 





 


Total Iron Binding Capacity


 


 496 ug/dL


(250-450) 


 





 


Iron Saturation  9 % (15-34)   


 


Ferritin


 


 19 ng/mL


(8-252) 


 





 


Triglycerides Level


 


 79 mg/dL


(0-150) 


 





 


Cholesterol Level


 


 174 mg/dL


(0-200) 


 





 


LDL Cholesterol, Calculated


 


 113 mg/dL


(0-100) 


 





 


VLDL Cholesterol, Calculated


 


 16 mg/dL


(0-40) 


 





 


Non-HDL Cholesterol Calculated


 


 129 mg/dL


(0-129) 


 





 


HDL Cholesterol


 


 45 mg/dL


(40-60) 


 





 


Cholesterol/HDL Ratio  3.9   


 


Vitamin B12 Level


 


 631 pg/mL


(247-911) 


 











Vitals/I & O





Vital Sign - Last 24 Hours








 10/22/18 10/22/18 10/22/18 10/22/18





 15:00 15:34 16:03 18:13


 


Temp 98.6   





 98.6   


 


Pulse 99   


 


Resp 20 20  20


 


B/P (MAP) 157/87 (110)   


 


Pulse Ox 95  92 


 


O2 Delivery Room Air Room Air Room Air Room Air


 


    





    





 10/22/18 10/22/18 10/22/18 10/22/18





 18:43 19:00 19:33 19:36


 


Temp  97.7  





  97.7  


 


Pulse  105  


 


Resp 18 18  


 


B/P (MAP)  145/77 (99)  


 


Pulse Ox  93 95 


 


O2 Delivery  Room Air Room Air Room Air


 


    





    





 10/22/18 10/22/18 10/22/18 10/22/18





 20:00 22:42 23:00 23:13


 


Temp   98.2 





   98.2 


 


Pulse   84 


 


Resp   18 


 


B/P (MAP)   145/74 (97) 


 


Pulse Ox   93 


 


O2 Delivery Room Air Room Air Room Air Room Air


 


    





    





 10/23/18 10/23/18 10/23/18 10/23/18





 00:25 03:00 03:07 05:24


 


Temp  96.4  





  96.4  


 


Pulse  96  


 


Resp  18  


 


B/P (MAP)  140/73 (95)  


 


Pulse Ox  93  


 


O2 Delivery Room Air Room Air Room Air Room Air


 


    





    





 10/23/18 10/23/18 10/23/18 10/23/18





 07:24 07:31 07:52 08:00


 


Temp   98.6 





   98.6 


 


Pulse   109 


 


Resp   18 


 


B/P (MAP)   186/102 (130) 


 


Pulse Ox 93 96 96 


 


O2 Delivery Room Air Room Air Room Air Room Air


 


    





    





 10/23/18 10/23/18 10/23/18 10/23/18





 08:53 08:53 08:58 10:05


 


Pulse 109 109  


 


B/P (MAP) 186/102 186/102  


 


Pulse Ox   96 96


 


O2 Delivery   Room Air Room Air





 10/23/18 10/23/18 10/23/18 10/23/18





 11:14 11:17 11:22 12:30


 


Temp  99.1  





  99.1  


 


Pulse  104  


 


Resp  18  


 


B/P (MAP)  158/87 (110)  


 


Pulse Ox 96 94 92 


 


O2 Delivery Room Air Room Air Room Air Room Air














Intake and Output   


 


 10/22/18 10/22/18 10/23/18





 15:00 23:00 07:00


 


Intake Total 1540 ml  


 


Balance 1540 ml  








Problem List


Problems


Medical Problems:


(1) Abdominal pain


Status: Acute  





(2) Chest pain


Status: Acute  





Assessment


Pain seems not GI-related.


Positive Hep C antibody--unable to order PCR for confirmation.


TRAVON--present before and not progressive.  Cause unclear.  Note stool FOBT 

ordered.


Plan of Care Note


Symptomatic treatment.


Await stool FOBT.


Will try to order HCV confirmation again.











CHELSIE WALLACE MD Oct 23, 2018 14:03

## 2018-10-23 NOTE — PDOC
PULMONARY PROGRESS NOTES


Subjective


PT NOT MORE SOA


Vitals





Vital Signs








  Date Time  Temp Pulse Resp B/P (MAP) Pulse Ox O2 Delivery O2 Flow Rate FiO2


 


10/23/18 08:58     96 Room Air  


 


10/23/18 08:53  109  186/102    


 


10/23/18 07:52 98.6  18     





 98.6       








Lungs:  Clear


Cardiovascular:  S1, S2


Abdomen:  Soft


Neuro Exam:  Alert


Extremities:  No Edema


Skin:  Warm


Labs





Laboratory Tests








Test


 10/21/18


14:38 10/21/18


15:07 10/21/18


15:36 10/21/18


16:26


 


White Blood Count


 7.2 x10^3/uL


(4.0-11.0) 


 


 





 


Red Blood Count


 4.29 x10^6/uL


(3.50-5.40) 


 


 





 


Hemoglobin


 11.0 g/dL


(12.0-15.5) 


 


 





 


Hematocrit


 33.3 %


(36.0-47.0) 


 


 





 


Mean Corpuscular Volume 78 fL ()    


 


Mean Corpuscular Hemoglobin 26 pg (25-35)    


 


Mean Corpuscular Hemoglobin


Concent 33 g/dL


(31-37) 


 


 





 


Red Cell Distribution Width


 16.1 %


(11.5-14.5) 


 


 





 


Platelet Count


 202 x10^3/uL


(140-400) 


 


 





 


Neutrophils (%) (Auto) 74 % (31-73)    


 


Lymphocytes (%) (Auto) 17 % (24-48)    


 


Monocytes (%) (Auto) 8 % (0-9)    


 


Eosinophils (%) (Auto) 1 % (0-3)    


 


Basophils (%) (Auto) 0 % (0-3)    


 


Neutrophils # (Auto)


 5.3 x10^3uL


(1.8-7.7) 


 


 





 


Lymphocytes # (Auto)


 1.2 x10^3/uL


(1.0-4.8) 


 


 





 


Monocytes # (Auto)


 0.6 x10^3/uL


(0.0-1.1) 


 


 





 


Eosinophils # (Auto)


 0.1 x10^3/uL


(0.0-0.7) 


 


 





 


Basophils # (Auto)


 0.0 x10^3/uL


(0.0-0.2) 


 


 





 


Sodium Level


 136 mmol/L


(136-145) 


 


 





 


Potassium Level


 3.7 mmol/L


(3.5-5.1) 


 


 





 


Chloride Level


 98 mmol/L


() 


 


 





 


Carbon Dioxide Level


 28 mmol/L


(21-32) 


 


 





 


Anion Gap 10 (6-14)    


 


Blood Urea Nitrogen 7 mg/dL (7-20)    


 


Creatinine


 0.9 mg/dL


(0.6-1.0) 


 


 





 


Estimated GFR


(Cockcroft-Gault) 66.5 


 


 


 





 


BUN/Creatinine Ratio 8 (6-20)    


 


Glucose Level


 216 mg/dL


(70-99) 


 


 





 


Hemoglobin A1c


 6.0 %


(4.8-5.6) 


 


 





 


Lactic Acid Level


 2.1 mmol/L


(0.4-2.0) 


 


 





 


Calcium Level


 8.7 mg/dL


(8.5-10.1) 


 


 





 


Magnesium Level


 1.7 mg/dL


(1.8-2.4) 


 


 





 


Total Bilirubin


 0.5 mg/dL


(0.2-1.0) 


 


 





 


Aspartate Amino Transf


(AST/SGOT) 60 U/L (15-37) 


 


 


 





 


Alanine Aminotransferase


(ALT/SGPT) 86 U/L (14-59) 


 


 


 





 


Alkaline Phosphatase


 115 U/L


() 


 


 





 


Creatine Kinase


 38 U/L


() 


 


 





 


Creatine Kinase MB (Mass)


 0.9 ng/mL


(0.0-3.6) 


 


 





 


Creatine Kinase MB Relative


Index 2.4 % (0-4) 


 


 


 





 


Troponin I Quantitative


 < 0.017 ng/mL


(0.000-0.055) 


 


 





 


NT-Pro-B-Type Natriuretic


Peptide 187 pg/mL


(0-124) 


 


 





 


Total Protein


 8.1 g/dL


(6.4-8.2) 


 


 





 


Albumin


 3.2 g/dL


(3.4-5.0) 


 


 





 


Albumin/Globulin Ratio 0.7 (1.0-1.7)    


 


Lipase


 138 U/L


() 


 


 





 


Thyroid Stimulating Hormone


(TSH) 1.349 uIU/mL


(0.358-3.74) 


 


 





 


Free Thyroxine


 1.04 ng/dL


(0.76-1.46) 


 


 





 


Influenza Type A Antigen


 Negative


(NEGATIVE) 


 


 





 


Influenza Type B Antigen


 Negative


(NEGATIVE) 


 


 





 


Urine Collection Type  Unknown   


 


Urine Color  Yellow   


 


Urine Clarity  Cloudy   


 


Urine pH  7.0   


 


Urine Specific Gravity  >=1.030   


 


Urine Protein


 


 Negative mg/dL


(NEG-TRACE) 


 





 


Urine Glucose (UA)


 


 >=1000 mg/dL


(NEG) 


 





 


Urine Ketones (Stick)


 


 Negative mg/dL


(NEG) 


 





 


Urine Blood  Negative (NEG)   


 


Urine Nitrite  Negative (NEG)   


 


Urine Bilirubin  Negative (NEG)   


 


Urine Urobilinogen Dipstick


 


 1.0 mg/dL (0.2


mg/dL) 


 





 


Urine Leukocyte Esterase  Negative (NEG)   


 


Urine RBC  0 /HPF (0-2)   


 


Urine WBC  0 /HPF (0-4)   


 


Urine Squamous Epithelial


Cells 


 Mod /LPF 


 


 





 


Urine Bacteria  0 /HPF (0-FEW)   


 


Urine Mucus  Slight /LPF   


 


Urine Opiates Screen  Neg (NEG)   


 


Urine Methadone Screen  Neg (NEG)   


 


Urine Barbiturates  Neg (NEG)   


 


Urine Phencyclidine Screen  Neg (NEG)   


 


Urine


Amphetamine/Methamphetamine 


 Pos (NEG) 


 


 





 


Urine Benzodiazepines Screen  Neg (NEG)   


 


Urine Cocaine Screen  Neg (NEG)   


 


Urine Cannabinoids Screen  Neg (NEG)   


 


Urine Ethyl Alcohol  Neg (NEG)   


 


Bedside Urine HCG, Qualitative


 


 


 Hcg negative


(Negative) 





 


Miscellaneous Test    Comment (.) 


 


Test


 10/21/18


22:00 10/22/18


03:20 


 





 


Troponin I Quantitative


 < 0.017 ng/mL


(0.000-0.055) 


 


 





 


White Blood Count


 


 9.8 x10^3/uL


(4.0-11.0) 


 





 


Red Blood Count


 


 4.42 x10^6/uL


(3.50-5.40) 


 





 


Hemoglobin


 


 11.3 g/dL


(12.0-15.5) 


 





 


Hematocrit


 


 34.5 %


(36.0-47.0) 


 





 


Mean Corpuscular Volume  78 fL ()   


 


Mean Corpuscular Hemoglobin  26 pg (25-35)   


 


Mean Corpuscular Hemoglobin


Concent 


 33 g/dL


(31-37) 


 





 


Red Cell Distribution Width


 


 16.8 %


(11.5-14.5) 


 





 


Platelet Count


 


 227 x10^3/uL


(140-400) 


 





 


Neutrophils (%) (Auto)  71 % (31-73)   


 


Lymphocytes (%) (Auto)  17 % (24-48)   


 


Monocytes (%) (Auto)  10 % (0-9)   


 


Eosinophils (%) (Auto)  1 % (0-3)   


 


Basophils (%) (Auto)  1 % (0-3)   


 


Neutrophils # (Auto)


 


 7.0 x10^3uL


(1.8-7.7) 


 





 


Lymphocytes # (Auto)


 


 1.6 x10^3/uL


(1.0-4.8) 


 





 


Monocytes # (Auto)


 


 1.0 x10^3/uL


(0.0-1.1) 


 





 


Eosinophils # (Auto)


 


 0.1 x10^3/uL


(0.0-0.7) 


 





 


Basophils # (Auto)


 


 0.1 x10^3/uL


(0.0-0.2) 


 





 


Sodium Level


 


 133 mmol/L


(136-145) 


 





 


Potassium Level


 


 4.2 mmol/L


(3.5-5.1) 


 





 


Chloride Level


 


 98 mmol/L


() 


 





 


Carbon Dioxide Level


 


 29 mmol/L


(21-32) 


 





 


Anion Gap  6 (6-14)   


 


Blood Urea Nitrogen  5 mg/dL (7-20)   


 


Creatinine


 


 0.8 mg/dL


(0.6-1.0) 


 





 


Estimated GFR


(Cockcroft-Gault) 


 76.2 


 


 





 


Glucose Level


 


 180 mg/dL


(70-99) 


 





 


Calcium Level


 


 8.4 mg/dL


(8.5-10.1) 


 





 


Iron Level


 


 45 ug/dL


() 


 





 


Total Iron Binding Capacity


 


 496 ug/dL


(250-450) 


 





 


Iron Saturation  9 % (15-34)   


 


Ferritin


 


 19 ng/mL


(8-252) 


 





 


Triglycerides Level


 


 79 mg/dL


(0-150) 


 





 


Cholesterol Level


 


 174 mg/dL


(0-200) 


 





 


LDL Cholesterol, Calculated


 


 113 mg/dL


(0-100) 


 





 


VLDL Cholesterol, Calculated


 


 16 mg/dL


(0-40) 


 





 


Non-HDL Cholesterol Calculated


 


 129 mg/dL


(0-129) 


 





 


HDL Cholesterol


 


 45 mg/dL


(40-60) 


 





 


Cholesterol/HDL Ratio  3.9   


 


Vitamin B12 Level


 


 631 pg/mL


(247-911) 


 











Medications





Active Scripts








 Medications  Dose


 Route/Sig


 Max Daily Dose Days Date Category


 


 Oxycodone-Acetaminophen


 5-325 (Oxycodone


 Hcl/Acetaminophen)


 1 Each Tablet  1 Tab


 PO PRN Q6HRS PRN


   11/30/17 Rx


 


 Lisinopril 20 Mg


 Tablet  40 Mg


 PO DAILY


   11/30/17 Rx











Impression


.


IMPRESSION:


1.  Generalized diffuse pain, could be related to methamphetamine withdrawal. 


No evidence of any pulmonary embolism.


2.  30 years of tobaccoism and suspect underlying chronic obstructive pulmonary


disease without exacerbation.


3.  Substance abuse.


4.  Ongoing tobaccoism.





Plan


.





1.  From a pulmonary standpoint, continue nebulizer treatments.  


2.  DVT prophylaxis.


3.  Smoking cessation counseling provided.


4.  Counseling regarding substance abuse was provided as well.


5.  No further recommendations at this point.











CHRISTIANO WING MD Oct 23, 2018 09:20

## 2018-10-23 NOTE — PDOC
PROGRESS NOTES


Chief Complaint


Chief Complaint


Abd pain


Pleurisy likely


Headache, 2/2


HTN urgency


Chronic bloody stool as per pt with mild anemia


h/o hep C 


microlobular contour of the liver which 


  can be seen with cirrhosis, similar to prior exam.


OCD


ADHD


H/O encephalitis


Tobaccoism


h/o drug abuse with marijuana, METH


Mild malnutrition





History of Present Illness


History of Present Illness


Pt seen and examined with RN


Pt has a very unusual affect


CO abd pain





Vitals


Vitals





Vital Signs








  Date Time  Temp Pulse Resp B/P (MAP) Pulse Ox O2 Delivery O2 Flow Rate FiO2


 


10/23/18 10:05     96 Room Air  


 


10/23/18 08:53  109  186/102    


 


10/23/18 07:52 98.6  18     





 98.6       











Physical Exam


General:  Alert, Oriented X3, Cooperative, mild distress


Heart:  Regular rate, Normal S1, Normal S2, Other (2/6 systolic murmur )


Lungs:  Clear


Abdomen:  Normal bowel sounds, Soft, Other (distended. tenderness upon palpation

)


Extremities:  No cyanosis, No edema, Normal pulses


Skin:  No significant lesion





Labs


LABS


PROCEDURE: CT ABD PELV W/ IV CONTRST ONLY





CTA CHEST, PE PROTOCOL, CT ABDOMEN AND PELVIS WITH CONTRAST


 


Clinical Indication: Chest pain, shortness of air, abdominal pain


 


COMPARISON: CT abdomen and pelvis dated 11/27/2017, chest radiograph dated


11/27/2017


 


TECHNIQUE:


 


Multiple contiguous axial images were obtained throughout the chest, 


abdomen, and pelvis with the use of IV contrast. Chest images were 


obtained following PE protocol. Coronal and sagittal MIPS reconstructions 


were performed. Coronal and sagittal reconstructions of the abdomen and 


pelvis CT were also performed. 75 mL Omni 300 was administered.


 


CTA Chest Findings:  


Limited evaluation of the distal pulmonary arterial system due to 


suboptimal distal contrast opacification. No evidence of central, lobar, 


or segmental pulmonary embolism.


 


The thyroid is symmetric. 


 


Calcified mediastinal and right hilar lymph nodes related to remote 


granulomatous disease. There is no axillary, mediastinal, or hilar 


adenopathy.  


 


The thoracic aorta diameter is normal. The cardiac size is normal.  There 


is no pericardial effusion.


 


Right lower lobe linear opacities likely related to atelectasis or 


scarring. There is no suspicious pulmonary nodule. There is no  focal 


consolidation. No pleural effusion is observed. There is no pneumothorax. 


Small tracheal diverticulum. The central airways are patent.


 


 


CT Abdomen findings:


The liver is enlarged measuring 19.2 cm. There again may be a 


microlobulated contour of the liver. The liver is otherwise normal. 


Contracted gallbladder. No radiopaque gallstone or pericholecystic fluid. 


The spleen is enlarged measuring 14.5 cm. The pancreas is normal in 


appearance. The adrenal glands are normal in appearance. The kidneys are 


unremarkable.  There is no significant mesenteric or retroperitoneal 


adenopathy identified.  There is no evidence of free intraperitoneal fluid


or pneumoperitoneum.  Mild colonic diverticulosis without evidence of 


diverticulitis. Visualized portions of the bowel are otherwise grossly 


unremarkable. Normal appendix. Mild atherosclerosis of the abdominal aorta


and its branches.


 


CT Pelvis findings:


The bladder is underdistended limiting evaluation for wall thickening. 


Fluid within the endometrial canal. 2.7 x 2.4 cm left ovarian cyst.  There


is no significant pelvic ascites.  No significant iliac or inguinal 


adenopathy is identified.  


 


No acute osseous abnormality. Mild multilevel degenerative changes of the 


visualized spine.


 


IMPRESSION:


1. Limited evaluation of the distal pulmonary arterial system due to 


suboptimal distal contrast opacification. No evidence of central, lobar, 


or segmental pulmonary embolism.


2. No acute intrathoracic, intra-abdominal, or intrapelvic process 


identified.


3. Hepatosplenomegaly. Possible microlobular contour of the liver which 


can be seen with cirrhosis, similar to prior exam.


4. Fluid within the endometrial canal and 2.7 x 2.4 cm left ovarian cyst 


are most likely physiologic given patient's age.


 


 


 


Right upper quadrant abdominal ultrasound, 10/22/2018:


 


HISTORY: Right upper quadrant pain


 


The gallbladder is within normal limits in size. There is no sonographic 


evidence of cholelithiasis. The gallbladder wall is not thickened. The 


common hepatic duct is of normal caliber. The liver is mildly enlarged 


measuring 19 cm in craniocaudad extent at the level the right lobe. There 


is no evidence of a hepatic mass. The visualized portions of the pancreas 


and right kidney are unremarkable.


 


IMPRESSION:


1. Mild hepatomegaly.


2. No gallbladder abnormality is detected.


 


Electronically signed by: Rick Moritz, MD (10/23/2018 7:48 AM) [facility]














DICTATED and SIGNED BY:     MORITZ,RICK S MD


DATE:     10/23/18 0745





Assessment and Plan


Assessmemt and Plan


Problems


Medical Problems:


(1) Abdominal pain


Status: Acute  





(2) Chest pain


Status: Acute  











Comment


Review of Relevant


I have reviewed the following items ta (where applicable) has been applied.


Labs





Laboratory Tests








Test


 10/21/18


14:38 10/21/18


15:07 10/21/18


15:36 10/21/18


16:26


 


White Blood Count


 7.2 x10^3/uL


(4.0-11.0) 


 


 





 


Red Blood Count


 4.29 x10^6/uL


(3.50-5.40) 


 


 





 


Hemoglobin


 11.0 g/dL


(12.0-15.5) 


 


 





 


Hematocrit


 33.3 %


(36.0-47.0) 


 


 





 


Mean Corpuscular Volume 78 fL ()    


 


Mean Corpuscular Hemoglobin 26 pg (25-35)    


 


Mean Corpuscular Hemoglobin


Concent 33 g/dL


(31-37) 


 


 





 


Red Cell Distribution Width


 16.1 %


(11.5-14.5) 


 


 





 


Platelet Count


 202 x10^3/uL


(140-400) 


 


 





 


Neutrophils (%) (Auto) 74 % (31-73)    


 


Lymphocytes (%) (Auto) 17 % (24-48)    


 


Monocytes (%) (Auto) 8 % (0-9)    


 


Eosinophils (%) (Auto) 1 % (0-3)    


 


Basophils (%) (Auto) 0 % (0-3)    


 


Neutrophils # (Auto)


 5.3 x10^3uL


(1.8-7.7) 


 


 





 


Lymphocytes # (Auto)


 1.2 x10^3/uL


(1.0-4.8) 


 


 





 


Monocytes # (Auto)


 0.6 x10^3/uL


(0.0-1.1) 


 


 





 


Eosinophils # (Auto)


 0.1 x10^3/uL


(0.0-0.7) 


 


 





 


Basophils # (Auto)


 0.0 x10^3/uL


(0.0-0.2) 


 


 





 


Sodium Level


 136 mmol/L


(136-145) 


 


 





 


Potassium Level


 3.7 mmol/L


(3.5-5.1) 


 


 





 


Chloride Level


 98 mmol/L


() 


 


 





 


Carbon Dioxide Level


 28 mmol/L


(21-32) 


 


 





 


Anion Gap 10 (6-14)    


 


Blood Urea Nitrogen 7 mg/dL (7-20)    


 


Creatinine


 0.9 mg/dL


(0.6-1.0) 


 


 





 


Estimated GFR


(Cockcroft-Gault) 66.5 


 


 


 





 


BUN/Creatinine Ratio 8 (6-20)    


 


Glucose Level


 216 mg/dL


(70-99) 


 


 





 


Hemoglobin A1c


 6.0 %


(4.8-5.6) 


 


 





 


Lactic Acid Level


 2.1 mmol/L


(0.4-2.0) 


 


 





 


Calcium Level


 8.7 mg/dL


(8.5-10.1) 


 


 





 


Magnesium Level


 1.7 mg/dL


(1.8-2.4) 


 


 





 


Total Bilirubin


 0.5 mg/dL


(0.2-1.0) 


 


 





 


Aspartate Amino Transf


(AST/SGOT) 60 U/L (15-37) 


 


 


 





 


Alanine Aminotransferase


(ALT/SGPT) 86 U/L (14-59) 


 


 


 





 


Alkaline Phosphatase


 115 U/L


() 


 


 





 


Creatine Kinase


 38 U/L


() 


 


 





 


Creatine Kinase MB (Mass)


 0.9 ng/mL


(0.0-3.6) 


 


 





 


Creatine Kinase MB Relative


Index 2.4 % (0-4) 


 


 


 





 


Troponin I Quantitative


 < 0.017 ng/mL


(0.000-0.055) 


 


 





 


NT-Pro-B-Type Natriuretic


Peptide 187 pg/mL


(0-124) 


 


 





 


Total Protein


 8.1 g/dL


(6.4-8.2) 


 


 





 


Albumin


 3.2 g/dL


(3.4-5.0) 


 


 





 


Albumin/Globulin Ratio 0.7 (1.0-1.7)    


 


Lipase


 138 U/L


() 


 


 





 


Thyroid Stimulating Hormone


(TSH) 1.349 uIU/mL


(0.358-3.74) 


 


 





 


Free Thyroxine


 1.04 ng/dL


(0.76-1.46) 


 


 





 


Influenza Type A Antigen


 Negative


(NEGATIVE) 


 


 





 


Influenza Type B Antigen


 Negative


(NEGATIVE) 


 


 





 


Urine Collection Type  Unknown   


 


Urine Color  Yellow   


 


Urine Clarity  Cloudy   


 


Urine pH  7.0   


 


Urine Specific Gravity  >=1.030   


 


Urine Protein


 


 Negative mg/dL


(NEG-TRACE) 


 





 


Urine Glucose (UA)


 


 >=1000 mg/dL


(NEG) 


 





 


Urine Ketones (Stick)


 


 Negative mg/dL


(NEG) 


 





 


Urine Blood  Negative (NEG)   


 


Urine Nitrite  Negative (NEG)   


 


Urine Bilirubin  Negative (NEG)   


 


Urine Urobilinogen Dipstick


 


 1.0 mg/dL (0.2


mg/dL) 


 





 


Urine Leukocyte Esterase  Negative (NEG)   


 


Urine RBC  0 /HPF (0-2)   


 


Urine WBC  0 /HPF (0-4)   


 


Urine Squamous Epithelial


Cells 


 Mod /LPF 


 


 





 


Urine Bacteria  0 /HPF (0-FEW)   


 


Urine Mucus  Slight /LPF   


 


Urine Opiates Screen  Neg (NEG)   


 


Urine Methadone Screen  Neg (NEG)   


 


Urine Barbiturates  Neg (NEG)   


 


Urine Phencyclidine Screen  Neg (NEG)   


 


Urine


Amphetamine/Methamphetamine 


 Pos (NEG) 


 


 





 


Urine Benzodiazepines Screen  Neg (NEG)   


 


Urine Cocaine Screen  Neg (NEG)   


 


Urine Cannabinoids Screen  Neg (NEG)   


 


Urine Ethyl Alcohol  Neg (NEG)   


 


Bedside Urine HCG, Qualitative


 


 


 Hcg negative


(Negative) 





 


Miscellaneous Test    Comment (.) 


 


Test


 10/21/18


22:00 10/22/18


03:20 


 





 


Troponin I Quantitative


 < 0.017 ng/mL


(0.000-0.055) 


 


 





 


White Blood Count


 


 9.8 x10^3/uL


(4.0-11.0) 


 





 


Red Blood Count


 


 4.42 x10^6/uL


(3.50-5.40) 


 





 


Hemoglobin


 


 11.3 g/dL


(12.0-15.5) 


 





 


Hematocrit


 


 34.5 %


(36.0-47.0) 


 





 


Mean Corpuscular Volume  78 fL ()   


 


Mean Corpuscular Hemoglobin  26 pg (25-35)   


 


Mean Corpuscular Hemoglobin


Concent 


 33 g/dL


(31-37) 


 





 


Red Cell Distribution Width


 


 16.8 %


(11.5-14.5) 


 





 


Platelet Count


 


 227 x10^3/uL


(140-400) 


 





 


Neutrophils (%) (Auto)  71 % (31-73)   


 


Lymphocytes (%) (Auto)  17 % (24-48)   


 


Monocytes (%) (Auto)  10 % (0-9)   


 


Eosinophils (%) (Auto)  1 % (0-3)   


 


Basophils (%) (Auto)  1 % (0-3)   


 


Neutrophils # (Auto)


 


 7.0 x10^3uL


(1.8-7.7) 


 





 


Lymphocytes # (Auto)


 


 1.6 x10^3/uL


(1.0-4.8) 


 





 


Monocytes # (Auto)


 


 1.0 x10^3/uL


(0.0-1.1) 


 





 


Eosinophils # (Auto)


 


 0.1 x10^3/uL


(0.0-0.7) 


 





 


Basophils # (Auto)


 


 0.1 x10^3/uL


(0.0-0.2) 


 





 


Sodium Level


 


 133 mmol/L


(136-145) 


 





 


Potassium Level


 


 4.2 mmol/L


(3.5-5.1) 


 





 


Chloride Level


 


 98 mmol/L


() 


 





 


Carbon Dioxide Level


 


 29 mmol/L


(21-32) 


 





 


Anion Gap  6 (6-14)   


 


Blood Urea Nitrogen  5 mg/dL (7-20)   


 


Creatinine


 


 0.8 mg/dL


(0.6-1.0) 


 





 


Estimated GFR


(Cockcroft-Gault) 


 76.2 


 


 





 


Glucose Level


 


 180 mg/dL


(70-99) 


 





 


Calcium Level


 


 8.4 mg/dL


(8.5-10.1) 


 





 


Iron Level


 


 45 ug/dL


() 


 





 


Total Iron Binding Capacity


 


 496 ug/dL


(250-450) 


 





 


Iron Saturation  9 % (15-34)   


 


Ferritin


 


 19 ng/mL


(8-252) 


 





 


Triglycerides Level


 


 79 mg/dL


(0-150) 


 





 


Cholesterol Level


 


 174 mg/dL


(0-200) 


 





 


LDL Cholesterol, Calculated


 


 113 mg/dL


(0-100) 


 





 


VLDL Cholesterol, Calculated


 


 16 mg/dL


(0-40) 


 





 


Non-HDL Cholesterol Calculated


 


 129 mg/dL


(0-129) 


 





 


HDL Cholesterol


 


 45 mg/dL


(40-60) 


 





 


Cholesterol/HDL Ratio  3.9   


 


Vitamin B12 Level


 


 631 pg/mL


(247-911) 


 











Medications





Current Medications


Morphine Sulfate (Morphine Sulfate) 4 mg PRN Q15MIN  PRN IV/SQ PAIN GREATER 

THAN 3/10 Last administered on 10/21/18at 17:11;  Start 10/21/18 at 14:30;  

Stop 10/22/18 at 14:29;  Status DC


Labetalol HCl (Normodyne Iv Push) 20 mg 1X  ONCE IVP  Last administered on 10/21

/18at 14:57;  Start 10/21/18 at 14:30;  Stop 10/21/18 at 14:31;  Status DC


Nitroglycerin (Nitro-Bid Oint) 1 inch 1X  ONCE TP  Last administered on 10/21/

18at 14:56;  Start 10/21/18 at 14:30;  Stop 10/21/18 at 14:31;  Status DC


Iohexol (Omnipaque 300 Mg/ml) 75 ml 1X  ONCE IV  Last administered on 10/21/

18at 16:00;  Start 10/21/18 at 14:45;  Stop 10/21/18 at 14:46;  Status DC


Info (CONTRAST GIVEN -- Rx MONITORING) 1 each PRN DAILY  PRN MC SEE COMMENTS;  

Start 10/21/18 at 14:45;  Stop 10/23/18 at 14:44


Acetaminophen (Tylenol) 650 mg PRN Q6HRS  PRN PO FEVER;  Start 10/21/18 at 16:30


Ondansetron HCl (Zofran) 4 mg PRN Q6HRS  PRN IV NAUSEA/VOMITING;  Start 10/21/

18 at 16:30


Morphine Sulfate (Morphine Sulfate) 2 mg PRN Q2HR  PRN IV MOD TO SEVERE PAIN, 

1ST CHOICE Last administered on 10/23/18at 07:24;  Start 10/21/18 at 16:30


Tramadol HCl (Ultram) 50 mg PRN Q6HRS  PRN PO MILD TO MODERATE PAIN Last 

administered on 10/23/18at 10:05;  Start 10/21/18 at 16:30


Docusate Sodium (Colace) 100 mg PRN DAILY  PRN PO CONSTIPATION;  Start 10/21/18 

at 16:30


Labetalol HCl (Normodyne Iv Push) 20 mg PRN Q2HR  PRN IVP HYPERTENSION, SEE 

COMMENTS;  Start 10/21/18 at 16:30


Lisinopril (Prinivil) 40 mg DAILY PO  Last administered on 10/23/18at 08:53;  

Start 10/21/18 at 17:00


Amlodipine Besylate (Norvasc) 10 mg DAILY PO  Last administered on 10/23/18at 08

:53;  Start 10/21/18 at 17:00


Sodium Chloride 1,000 ml @  100 mls/hr Q10H IV  Last administered on 10/23/18at 

08:30;  Start 10/21/18 at 16:30


Magnesium Sulfate 50 ml @ 25 mls/hr 1X  ONCE IV  Last administered on 10/21/

18at 21:05;  Start 10/21/18 at 16:45;  Stop 10/21/18 at 18:44;  Status DC


Pantoprazole Sodium (Protonix) 40 mg DAILYAC PO  Last administered on 10/23/

18at 07:24;  Start 10/22/18 at 07:30


Enoxaparin Sodium (Lovenox 40mg Syringe) 40 mg Q24H SQ  Last administered on 10/

22/18at 22:43;  Start 10/21/18 at 21:00


Albuterol/ Ipratropium (Duoneb) 3 ml RTQID NEB  Last administered on 10/23/18at 

07:30;  Start 10/21/18 at 16:30


Albuterol Sulfate (Ventolin Neb Soln) 2.5 mg PRN Q2HR  PRN NEB SHORTNESS OF 

BREATH;  Start 10/21/18 at 16:45


Guaifenesin (Mucinex) 600 mg BID PO  Last administered on 10/23/18at 08:52;  

Start 10/21/18 at 21:00


Nicotine (Nicoderm Cq 21mg) 1 patch PRN DAILY  PRN TD SMOKING CESSATION;  Start 

10/21/18 at 16:45;  Stop 10/21/18 at 18:17;  Status DC


Nicotine (Nicoderm Cq 21mg) 1 patch DAILY TD  Last administered on 10/22/18at 09

:11;  Start 10/21/18 at 18:30


Fentanyl Citrate (Fentanyl 2ml Vial) 50 mcg PRN Q2HR  PRN IV MOD TO SEVERE PAIN

, 2ND CHOICE Last administered on 10/22/18at 22:42;  Start 10/22/18 at 02:30


Lisinopril (Prinivil) 5 mg DAILY PO ;  Start 10/23/18 at 09:00;  Status UNV





Active Scripts


Active


Oxycodone-Acetaminophen 5-325 (Oxycodone Hcl/Acetaminophen) 1 Each Tablet 1 Tab 

PO PRN Q6HRS PRN


Lisinopril 20 Mg Tablet 40 Mg PO DAILY


Vitals/I & O





Vital Sign - Last 24 Hours








 10/22/18 10/22/18 10/22/18 10/22/18





 11:54 13:19 15:00 15:34


 


Temp   98.6 





   98.6 


 


Pulse   99 


 


Resp  20 20 20


 


B/P (MAP)   157/87 (110) 


 


Pulse Ox 92  95 


 


O2 Delivery Room Air Room Air Room Air Room Air


 


    





    





 10/22/18 10/22/18 10/22/18 10/22/18





 16:03 18:13 18:43 19:00


 


Temp    97.7





    97.7


 


Pulse    105


 


Resp  20 18 18


 


B/P (MAP)    145/77 (99)


 


Pulse Ox 92   93


 


O2 Delivery Room Air Room Air  Room Air


 


    





    





 10/22/18 10/22/18 10/22/18 10/22/18





 19:33 19:36 20:00 22:42


 


Pulse Ox 95   


 


O2 Delivery Room Air Room Air Room Air Room Air





 10/22/18 10/22/18 10/23/18 10/23/18





 23:00 23:13 00:25 03:00


 


Temp 98.2   96.4





 98.2   96.4


 


Pulse 84   96


 


Resp 18   18


 


B/P (MAP) 145/74 (97)   140/73 (95)


 


Pulse Ox 93   93


 


O2 Delivery Room Air Room Air Room Air Room Air


 


    





    





 10/23/18 10/23/18 10/23/18 10/23/18





 03:07 05:24 07:24 07:31


 


Pulse Ox   93 96


 


O2 Delivery Room Air Room Air Room Air Room Air





 10/23/18 10/23/18 10/23/18 10/23/18





 07:52 08:53 08:53 08:58


 


Temp 98.6   





 98.6   


 


Pulse 109 109 109 


 


Resp 18   


 


B/P (MAP) 186/102 (130) 186/102 186/102 


 


Pulse Ox 96   96


 


O2 Delivery Room Air   Room Air


 


    





    





 10/23/18   





 10:05   


 


Pulse Ox 96   


 


O2 Delivery Room Air   














Intake and Output   


 


 10/22/18 10/22/18 10/23/18





 15:00 23:00 07:00


 


Intake Total 1540 ml  


 


Balance 1540 ml  

















JASON COOMBS MD Oct 23, 2018 11:02

## 2018-10-24 NOTE — PDOC2
GI CONSULT


Allergies:


Coded Allergies:  


     No Known Drug Allergies (Unverified , 11/27/17)











ALFA PORTER Oct 24, 2018 09:07

## 2018-10-24 NOTE — PDOC
Subjective:


Subjective:


She wants to know why she has pain in her head and her chest and her arms and 

her shoulder.


Tolerating PO.


Hasn't stooled.





Objective:


Vital Signs:





 Vital Signs








  Date Time  Temp Pulse Resp B/P (MAP) Pulse Ox O2 Delivery O2 Flow Rate FiO2


 


10/24/18 08:52  87  176/92    


 


10/24/18 08:51   18   Room Air  


 


10/24/18 07:13     95   


 


10/24/18 07:00 97.4       





 97.4       











PE:





GEN: NAD, up to chair, breakfast tray completely empty


LUNGS: productive cough


HEART: RRR


ABD: non-tender


NEURO/PSYCH: A & O 3





A/P:


Pain - diffuse, seems MSK


TRAVON - no previous 'scopes


+Hep C Ab - AST and ALT mildly elevated on admission, hepatosplenomegaly on 

imaging





--


Add Miralax, Dulcolax if she wants.


Could pursue outpt EGD and colonoscopy.











ALFA PORTER Oct 24, 2018 09:04

## 2018-10-24 NOTE — PDOC3
Discharge Summary


Date of Admission:  Oct 21, 2018


Date of Discharge:  Oct 24, 2018


Follow-Up:  3-5 days


Admitting Diagnosis comment:


Chief Complaint


Chief Complaint


Abd pain


Pleurisy likely


Headache, meth withdrawal


HTN urgency


Chronic bloody stool as per pt with mild anemia


h/o hep C 


microlobular contour of the liver which 


  can be seen with cirrhosis, similar to prior exam.


OCD


ADHD


H/O encephalitis


Tobaccoism


h/o drug abuse with marijuana, METH


Mild malnutrition





History of Present Illness


History of Present Illness


Pt seen and examined with RN


Pt has a very unusual affect








Vitals


Vitals





Vital Signs








  Date Time  Temp Pulse Resp B/P (MAP) Pulse Ox O2 Delivery O2 Flow Rate FiO2


 


10/24/18 09:51   16   Room Air  


 


10/24/18 08:52  87  176/92    


 


10/24/18 07:13     95   


 


10/24/18 07:00 97.4       





 97.4       











Physical Exam


General:  Alert, Oriented X3, Cooperative, mild distress


Heart:  Regular rate, Normal S1, Normal S2, Other (2/6 systolic murmur )


Lungs:  Clear


Abdomen:  Normal bowel sounds, Soft, Other (distended. tenderness upon palpation

)


Extremities:  No cyanosis, No edema, Normal pulses


Skin:  No significant lesion


FINAL DIAGNOSIS


Problems


Medical Problems:


(1) Abdominal pain


Status: Acute  





(2) Chest pain


Status: Acute  








Brief Hospital Course


Ms. rBavo  is a 48 old [sex] who presented with [meth abuse ]


CONDITION AT DISCHARGE:  Improved


Discharge Medications





Current Medications


Morphine Sulfate (Morphine Sulfate) 4 mg PRN Q15MIN  PRN IV/SQ PAIN GREATER 

THAN 3/10 Last administered on 10/21/18at 17:11;  Start 10/21/18 at 14:30;  

Stop 10/22/18 at 14:29;  Status DC


Labetalol HCl (Normodyne Iv Push) 20 mg 1X  ONCE IVP  Last administered on 10/21

/18at 14:57;  Start 10/21/18 at 14:30;  Stop 10/21/18 at 14:31;  Status DC


Nitroglycerin (Nitro-Bid Oint) 1 inch 1X  ONCE TP  Last administered on 10/21/

18at 14:56;  Start 10/21/18 at 14:30;  Stop 10/21/18 at 14:31;  Status DC


Iohexol (Omnipaque 300 Mg/ml) 75 ml 1X  ONCE IV  Last administered on 10/21/

18at 16:00;  Start 10/21/18 at 14:45;  Stop 10/21/18 at 14:46;  Status DC


Info (CONTRAST GIVEN -- Rx MONITORING) 1 each PRN DAILY  PRN MC SEE COMMENTS;  

Start 10/21/18 at 14:45;  Stop 10/23/18 at 14:44;  Status DC


Acetaminophen (Tylenol) 650 mg PRN Q6HRS  PRN PO FEVER;  Start 10/21/18 at 16:30


Ondansetron HCl (Zofran) 4 mg PRN Q6HRS  PRN IV NAUSEA/VOMITING;  Start 10/21/

18 at 16:30


Morphine Sulfate (Morphine Sulfate) 2 mg PRN Q2HR  PRN IV MOD TO SEVERE PAIN, 

1ST CHOICE Last administered on 10/23/18at 07:24;  Start 10/21/18 at 16:30


Tramadol HCl (Ultram) 50 mg PRN Q6HRS  PRN PO MILD TO MODERATE PAIN Last 

administered on 10/24/18at 08:51;  Start 10/21/18 at 16:30


Docusate Sodium (Colace) 100 mg PRN DAILY  PRN PO CONSTIPATION;  Start 10/21/18 

at 16:30


Labetalol HCl (Normodyne Iv Push) 20 mg PRN Q2HR  PRN IVP HYPERTENSION, SEE 

COMMENTS;  Start 10/21/18 at 16:30


Lisinopril (Prinivil) 40 mg DAILY PO  Last administered on 10/24/18at 08:50;  

Start 10/21/18 at 17:00


Amlodipine Besylate (Norvasc) 10 mg DAILY PO  Last administered on 10/24/18at 08

:52;  Start 10/21/18 at 17:00


Sodium Chloride 1,000 ml @  100 mls/hr Q10H IV  Last administered on 10/24/18at 

04:11;  Start 10/21/18 at 16:30


Magnesium Sulfate 50 ml @ 25 mls/hr 1X  ONCE IV  Last administered on 10/21/

18at 21:05;  Start 10/21/18 at 16:45;  Stop 10/21/18 at 18:44;  Status DC


Pantoprazole Sodium (Protonix) 40 mg DAILYAC PO  Last administered on 10/24/

18at 08:51;  Start 10/22/18 at 07:30


Enoxaparin Sodium (Lovenox 40mg Syringe) 40 mg Q24H SQ  Last administered on 10/

23/18at 21:42;  Start 10/21/18 at 21:00


Albuterol/ Ipratropium (Duoneb) 3 ml RTQID NEB  Last administered on 10/24/18at 

11:17;  Start 10/21/18 at 16:30


Albuterol Sulfate (Ventolin Neb Soln) 2.5 mg PRN Q2HR  PRN NEB SHORTNESS OF 

BREATH;  Start 10/21/18 at 16:45


Guaifenesin (Mucinex) 600 mg BID PO  Last administered on 10/24/18at 08:52;  

Start 10/21/18 at 21:00


Nicotine (Nicoderm Cq 21mg) 1 patch PRN DAILY  PRN TD SMOKING CESSATION;  Start 

10/21/18 at 16:45;  Stop 10/21/18 at 18:17;  Status DC


Nicotine (Nicoderm Cq 21mg) 1 patch DAILY TD  Last administered on 10/22/18at 09

:11;  Start 10/21/18 at 18:30


Fentanyl Citrate (Fentanyl 2ml Vial) 50 mcg PRN Q2HR  PRN IV MOD TO SEVERE PAIN

, 2ND CHOICE Last administered on 10/22/18at 22:42;  Start 10/22/18 at 02:30;  

Stop 10/23/18 at 11:24;  Status DC


Lisinopril (Prinivil) 5 mg DAILY PO ;  Start 10/23/18 at 09:00;  Status UNV


Fentanyl Citrate (Fentanyl 2ml Vial) 50 mcg PRN Q4HRS  PRN IV MOD TO SEVERE PAIN

, 2ND CHOICE Last administered on 10/23/18at 21:41;  Start 10/23/18 at 11:30





Active Scripts


Active


Oxycodone-Acetaminophen 5-325 (Oxycodone Hcl/Acetaminophen) 1 Each Tablet 1 Tab 

PO PRN Q6HRS PRN


Lisinopril 20 Mg Tablet 40 Mg PO DAILY


Vital Signs





Vital Signs








  Date Time  Temp Pulse Resp B/P (MAP) Pulse Ox O2 Delivery O2 Flow Rate FiO2


 


10/24/18 11:18      Room Air  


 


10/24/18 11:00 98.7 96 18 172/83 (112) 94   





 98.7       








Allergies





 Allergies








Coded Allergies Type Severity Reaction Last Updated Verified


 


  No Known Drug Allergies    11/27/17 No








Disposition/Orders:  D/C to Home


Patient Instructions


d/c planning 35 min











FULBRIGHT,JASON W MD Oct 24, 2018 14:10

## 2018-10-24 NOTE — PDOC
PROGRESS NOTES


Chief Complaint


Chief Complaint


Abd pain


Pleurisy likely


Headache, 2/2


HTN urgency


Chronic bloody stool as per pt with mild anemia


h/o hep C 


microlobular contour of the liver which 


  can be seen with cirrhosis, similar to prior exam.


OCD


ADHD


H/O encephalitis


Tobaccoism


h/o drug abuse with marijuana, METH


Mild malnutrition





History of Present Illness


History of Present Illness


Pt seen and examined with RN


Pt has a very unusual affect


CO abd pain





Vitals


Vitals





Vital Signs








  Date Time  Temp Pulse Resp B/P (MAP) Pulse Ox O2 Delivery O2 Flow Rate FiO2


 


10/24/18 09:51   16   Room Air  


 


10/24/18 08:52  87  176/92    


 


10/24/18 07:13     95   


 


10/24/18 07:00 97.4       





 97.4       











Physical Exam


General:  Alert, Oriented X3, Cooperative, mild distress


Heart:  Regular rate, Normal S1, Normal S2, Other (2/6 systolic murmur )


Lungs:  Clear


Abdomen:  Normal bowel sounds, Soft, Other (distended. tenderness upon palpation

)


Extremities:  No cyanosis, No edema, Normal pulses


Skin:  No significant lesion





Assessment and Plan


Assessmemt and Plan


Problems


Medical Problems:


(1) Abdominal pain


Status: Acute  





(2) Chest pain


Status: Acute  











Comment


Review of Relevant


I have reviewed the following items ta (where applicable) has been applied.


Medications





Current Medications


Morphine Sulfate (Morphine Sulfate) 4 mg PRN Q15MIN  PRN IV/SQ PAIN GREATER 

THAN 3/10 Last administered on 10/21/18at 17:11;  Start 10/21/18 at 14:30;  

Stop 10/22/18 at 14:29;  Status DC


Labetalol HCl (Normodyne Iv Push) 20 mg 1X  ONCE IVP  Last administered on 10/21

/18at 14:57;  Start 10/21/18 at 14:30;  Stop 10/21/18 at 14:31;  Status DC


Nitroglycerin (Nitro-Bid Oint) 1 inch 1X  ONCE TP  Last administered on 10/21/

18at 14:56;  Start 10/21/18 at 14:30;  Stop 10/21/18 at 14:31;  Status DC


Iohexol (Omnipaque 300 Mg/ml) 75 ml 1X  ONCE IV  Last administered on 10/21/

18at 16:00;  Start 10/21/18 at 14:45;  Stop 10/21/18 at 14:46;  Status DC


Info (CONTRAST GIVEN -- Rx MONITORING) 1 each PRN DAILY  PRN MC SEE COMMENTS;  

Start 10/21/18 at 14:45;  Stop 10/23/18 at 14:44;  Status DC


Acetaminophen (Tylenol) 650 mg PRN Q6HRS  PRN PO FEVER;  Start 10/21/18 at 16:30


Ondansetron HCl (Zofran) 4 mg PRN Q6HRS  PRN IV NAUSEA/VOMITING;  Start 10/21/

18 at 16:30


Morphine Sulfate (Morphine Sulfate) 2 mg PRN Q2HR  PRN IV MOD TO SEVERE PAIN, 

1ST CHOICE Last administered on 10/23/18at 07:24;  Start 10/21/18 at 16:30


Tramadol HCl (Ultram) 50 mg PRN Q6HRS  PRN PO MILD TO MODERATE PAIN Last 

administered on 10/24/18at 08:51;  Start 10/21/18 at 16:30


Docusate Sodium (Colace) 100 mg PRN DAILY  PRN PO CONSTIPATION;  Start 10/21/18 

at 16:30


Labetalol HCl (Normodyne Iv Push) 20 mg PRN Q2HR  PRN IVP HYPERTENSION, SEE 

COMMENTS;  Start 10/21/18 at 16:30


Lisinopril (Prinivil) 40 mg DAILY PO  Last administered on 10/24/18at 08:50;  

Start 10/21/18 at 17:00


Amlodipine Besylate (Norvasc) 10 mg DAILY PO  Last administered on 10/24/18at 08

:52;  Start 10/21/18 at 17:00


Sodium Chloride 1,000 ml @  100 mls/hr Q10H IV  Last administered on 10/24/18at 

04:11;  Start 10/21/18 at 16:30


Magnesium Sulfate 50 ml @ 25 mls/hr 1X  ONCE IV  Last administered on 10/21/

18at 21:05;  Start 10/21/18 at 16:45;  Stop 10/21/18 at 18:44;  Status DC


Pantoprazole Sodium (Protonix) 40 mg DAILYAC PO  Last administered on 10/24/

18at 08:51;  Start 10/22/18 at 07:30


Enoxaparin Sodium (Lovenox 40mg Syringe) 40 mg Q24H SQ  Last administered on 10/

23/18at 21:42;  Start 10/21/18 at 21:00


Albuterol/ Ipratropium (Duoneb) 3 ml RTQID NEB  Last administered on 10/24/18at 

07:12;  Start 10/21/18 at 16:30


Albuterol Sulfate (Ventolin Neb Soln) 2.5 mg PRN Q2HR  PRN NEB SHORTNESS OF 

BREATH;  Start 10/21/18 at 16:45


Guaifenesin (Mucinex) 600 mg BID PO  Last administered on 10/24/18at 08:52;  

Start 10/21/18 at 21:00


Nicotine (Nicoderm Cq 21mg) 1 patch PRN DAILY  PRN TD SMOKING CESSATION;  Start 

10/21/18 at 16:45;  Stop 10/21/18 at 18:17;  Status DC


Nicotine (Nicoderm Cq 21mg) 1 patch DAILY TD  Last administered on 10/22/18at 09

:11;  Start 10/21/18 at 18:30


Fentanyl Citrate (Fentanyl 2ml Vial) 50 mcg PRN Q2HR  PRN IV MOD TO SEVERE PAIN

, 2ND CHOICE Last administered on 10/22/18at 22:42;  Start 10/22/18 at 02:30;  

Stop 10/23/18 at 11:24;  Status DC


Lisinopril (Prinivil) 5 mg DAILY PO ;  Start 10/23/18 at 09:00;  Status UNV


Fentanyl Citrate (Fentanyl 2ml Vial) 50 mcg PRN Q4HRS  PRN IV MOD TO SEVERE PAIN

, 2ND CHOICE Last administered on 10/23/18at 21:41;  Start 10/23/18 at 11:30





Active Scripts


Active


Oxycodone-Acetaminophen 5-325 (Oxycodone Hcl/Acetaminophen) 1 Each Tablet 1 Tab 

PO PRN Q6HRS PRN


Lisinopril 20 Mg Tablet 40 Mg PO DAILY


Vitals/I & O





Vital Sign - Last 24 Hours








 10/23/18 10/23/18 10/23/18 10/23/18





 11:17 11:22 12:30 15:00


 


Temp 99.1   100.2





 99.1   100.2


 


Pulse 104   110


 


Resp 18   18


 


B/P (MAP) 158/87 (110)   172/94 (120)


 


Pulse Ox 94 92  95


 


O2 Delivery Room Air Room Air Room Air Room Air


 


    





    





 10/23/18 10/23/18 10/23/18 10/23/18





 15:12 17:00 19:00 20:00


 


Temp   99.0 





   99.0 


 


Pulse   108 


 


Resp   18 


 


B/P (MAP)   174/90 (118) 


 


Pulse Ox 92 92 98 


 


O2 Delivery Room Air Room Air Room Air Room Air


 


    





    





 10/23/18 10/23/18 10/23/18 10/23/18





 20:33 21:41 22:21 23:00


 


Temp    98.2





    98.2


 


Pulse    97


 


Resp  18 18 18


 


B/P (MAP)    157/87 (110)


 


Pulse Ox  98 98 95


 


O2 Delivery Room Air Room Air Room Air Room Air


 


    





    





 10/24/18 10/24/18 10/24/18 10/24/18





 01:57 02:57 03:00 07:00


 


Temp   98.9 97.4





   98.9 97.4


 


Pulse   84 95


 


Resp 18  18 18


 


B/P (MAP)   129/80 (96) 153/79 (103)


 


Pulse Ox 95 95 95 95


 


O2 Delivery Room Air  Room Air 


 


    





    





 10/24/18 10/24/18 10/24/18 10/24/18





 07:13 08:50 08:51 08:52


 


Pulse  87  87


 


Resp   18 


 


B/P (MAP)  176/92  176/92


 


Pulse Ox 95   


 


O2 Delivery Room Air  Room Air 





 10/24/18   





 09:51   


 


Resp 16   


 


O2 Delivery Room Air   














Intake and Output   


 


 10/23/18 10/23/18 10/24/18





 15:00 23:00 07:00


 


Intake Total   1300 ml


 


Balance   1300 ml

















JASON COOMBS MD Oct 24, 2018 10:30

## 2018-10-24 NOTE — PDOC
PULMONARY PROGRESS NOTES


Subjective


PT NOT MORE SOA


Vitals





Vital Signs








  Date Time  Temp Pulse Resp B/P (MAP) Pulse Ox O2 Delivery O2 Flow Rate FiO2


 


10/24/18 08:52  87  176/92    


 


10/24/18 08:51   18   Room Air  


 


10/24/18 07:13     95   


 


10/24/18 07:00 97.4       





 97.4       








Lungs:  Clear


Cardiovascular:  S1, S2


Abdomen:  Soft


Neuro Exam:  Alert


Extremities:  No Edema


Skin:  Warm


Medications





Active Scripts








 Medications  Dose


 Route/Sig


 Max Daily Dose Days Date Category


 


 Oxycodone-Acetaminophen


 5-325 (Oxycodone


 Hcl/Acetaminophen)


 1 Each Tablet  1 Tab


 PO PRN Q6HRS PRN


   11/30/17 Rx


 


 Lisinopril 20 Mg


 Tablet  40 Mg


 PO DAILY


   11/30/17 Rx











Impression


.


IMPRESSION:


1.  Generalized diffuse pain, could be related to methamphetamine withdrawal. 


No evidence of any pulmonary embolism.


2.  30 years of tobaccoism and suspect underlying chronic obstructive pulmonary


disease without exacerbation.


3.  Substance abuse.


4.  Ongoing tobaccoism.





Plan


.


D/C HOME'


PT INSTRUCTED TO D/C ALL DRUGS AND TOBACCO











CHRISTIANO WING MD Oct 24, 2018 10:20

## 2018-10-24 NOTE — DISCH
DISCHARGE INSTRUCTIONS


Condition on Discharge


Condition on Discharge:  Stable





Activity After Discharge


Activity Instructions for Disc:  No restrictions, Activity as tolerated


Exercise Instruction after Dis:  Walk 10 min, 3 x per day


Driving Instructions after Dis:  Do not drive


Weight Bearing Status after Di:  No restrictions





Diet after Discharge


Diet after Discharge:  No Added Salt, Regular





Wound Incision Care


Wound/Incision Care:  Ice to area for comfort





Checks after Discharge


Checks after discharge:  Check blood press - daily





Contacting the DR. after DC


Call your doctor for:  If your condition worsens





Treatment/Equipment after DC


Adaptive Equipment Issued:  None





Warfarin Follow-Up


Warfarin Follow UP:  no meth or thc use











JASON COOMBS MD Oct 24, 2018 14:11

## 2019-09-07 ENCOUNTER — HOSPITAL ENCOUNTER (EMERGENCY)
Dept: HOSPITAL 61 - ER | Age: 49
Discharge: HOME | End: 2019-09-07
Payer: SELF-PAY

## 2019-09-07 VITALS — DIASTOLIC BLOOD PRESSURE: 85 MMHG | SYSTOLIC BLOOD PRESSURE: 150 MMHG

## 2019-09-07 VITALS — HEIGHT: 62 IN | BODY MASS INDEX: 40.48 KG/M2 | WEIGHT: 220 LBS

## 2019-09-07 DIAGNOSIS — I16.0: Primary | ICD-10-CM

## 2019-09-07 DIAGNOSIS — R79.89: ICD-10-CM

## 2019-09-07 DIAGNOSIS — F42.9: ICD-10-CM

## 2019-09-07 LAB
ALBUMIN SERPL-MCNC: 3.1 G/DL (ref 3.4–5)
ALBUMIN/GLOB SERPL: 0.6 {RATIO} (ref 1–1.7)
ALP SERPL-CCNC: 127 U/L (ref 46–116)
ALT SERPL-CCNC: 127 U/L (ref 14–59)
ANION GAP SERPL CALC-SCNC: 9 MMOL/L (ref 6–14)
APTT PPP: YELLOW S
AST SERPL-CCNC: 86 U/L (ref 15–37)
BACTERIA #/AREA URNS HPF: (no result) /HPF
BASOPHILS # BLD AUTO: 0 X10^3/UL (ref 0–0.2)
BASOPHILS NFR BLD: 0 % (ref 0–3)
BILIRUB SERPL-MCNC: 0.5 MG/DL (ref 0.2–1)
BILIRUB UR QL STRIP: NEGATIVE
BUN SERPL-MCNC: 8 MG/DL (ref 7–20)
BUN/CREAT SERPL: 10 (ref 6–20)
CALCIUM SERPL-MCNC: 8.7 MG/DL (ref 8.5–10.1)
CHLORIDE SERPL-SCNC: 99 MMOL/L (ref 98–107)
CK SERPL-CCNC: 59 U/L (ref 26–192)
CO2 SERPL-SCNC: 29 MMOL/L (ref 21–32)
CREAT SERPL-MCNC: 0.8 MG/DL (ref 0.6–1)
EOSINOPHIL NFR BLD: 0.1 X10^3/UL (ref 0–0.7)
EOSINOPHIL NFR BLD: 1 % (ref 0–3)
ERYTHROCYTE [DISTWIDTH] IN BLOOD BY AUTOMATED COUNT: 21.8 % (ref 11.5–14.5)
FIBRINOGEN PPP-MCNC: CLEAR MG/DL
GFR SERPLBLD BASED ON 1.73 SQ M-ARVRAT: 75.9 ML/MIN
GLOBULIN SER-MCNC: 4.8 G/DL (ref 2.2–3.8)
GLUCOSE SERPL-MCNC: 241 MG/DL (ref 70–99)
HCT VFR BLD CALC: 38.4 % (ref 36–47)
HGB BLD-MCNC: 12.4 G/DL (ref 12–15.5)
LIPASE: 118 U/L (ref 73–393)
LYMPHOCYTES # BLD: 1.8 X10^3/UL (ref 1–4.8)
LYMPHOCYTES NFR BLD AUTO: 23 % (ref 24–48)
MACROCYTES BLD QL SMEAR: SLIGHT
MAGNESIUM SERPL-MCNC: 1.8 MG/DL (ref 1.8–2.4)
MCH RBC QN AUTO: 23 PG (ref 25–35)
MCHC RBC AUTO-ENTMCNC: 32 G/DL (ref 31–37)
MCV RBC AUTO: 71 FL (ref 79–100)
MICROCYTES BLD QL SMEAR: SLIGHT
MONO #: 0.6 X10^3/UL (ref 0–1.1)
MONOCYTES NFR BLD: 8 % (ref 0–9)
NEUT #: 5.2 X10^3/UL (ref 1.8–7.7)
NEUTROPHILS NFR BLD AUTO: 67 % (ref 31–73)
NITRITE UR QL STRIP: NEGATIVE
PH UR STRIP: 6.5 [PH]
PLATELET # BLD AUTO: 191 X10^3/UL (ref 140–400)
PLATELET # BLD EST: ADEQUATE 10*3/UL
POTASSIUM SERPL-SCNC: 4.3 MMOL/L (ref 3.5–5.1)
PROT SERPL-MCNC: 7.9 G/DL (ref 6.4–8.2)
PROT UR STRIP-MCNC: NEGATIVE MG/DL
RBC # BLD AUTO: 5.42 X10^6/UL (ref 3.5–5.4)
RBC #/AREA URNS HPF: (no result) /HPF (ref 0–2)
SODIUM SERPL-SCNC: 137 MMOL/L (ref 136–145)
SQUAMOUS #/AREA URNS LPF: (no result) /LPF
UROBILINOGEN UR-MCNC: 0.2 MG/DL
WBC # BLD AUTO: 7.8 X10^3/UL (ref 4–11)
WBC #/AREA URNS HPF: (no result) /HPF (ref 0–4)

## 2019-09-07 PROCEDURE — 70450 CT HEAD/BRAIN W/O DYE: CPT

## 2019-09-07 PROCEDURE — 80053 COMPREHEN METABOLIC PANEL: CPT

## 2019-09-07 PROCEDURE — 96374 THER/PROPH/DIAG INJ IV PUSH: CPT

## 2019-09-07 PROCEDURE — 85025 COMPLETE CBC W/AUTO DIFF WBC: CPT

## 2019-09-07 PROCEDURE — 82553 CREATINE MB FRACTION: CPT

## 2019-09-07 PROCEDURE — 74177 CT ABD & PELVIS W/CONTRAST: CPT

## 2019-09-07 PROCEDURE — 93005 ELECTROCARDIOGRAM TRACING: CPT

## 2019-09-07 PROCEDURE — 83735 ASSAY OF MAGNESIUM: CPT

## 2019-09-07 PROCEDURE — 83690 ASSAY OF LIPASE: CPT

## 2019-09-07 PROCEDURE — 84484 ASSAY OF TROPONIN QUANT: CPT

## 2019-09-07 PROCEDURE — 83605 ASSAY OF LACTIC ACID: CPT

## 2019-09-07 PROCEDURE — 99285 EMERGENCY DEPT VISIT HI MDM: CPT

## 2019-09-07 PROCEDURE — 36415 COLL VENOUS BLD VENIPUNCTURE: CPT

## 2019-09-07 PROCEDURE — 96375 TX/PRO/DX INJ NEW DRUG ADDON: CPT

## 2019-09-07 PROCEDURE — 81001 URINALYSIS AUTO W/SCOPE: CPT

## 2019-09-07 NOTE — RAD
CT abdomen pelvis with contrast.

 

HISTORY: Distention, pain

 

CT scan of the abdomen and pelvis was done using 75 mL Omnipaque 300 

contrast. Lung bases are clear. There is no effusion. Liver is normal in 

appearance. Gallbladder is contracted. Spleen is upper normal in size. 

Adrenals glands are normal. Pancreas is normal. There is no mass or 

hydronephrosis in the kidneys. There is a right renal cyst measuring 1.5 

cm. There is no bowel obstruction. There is no adenopathy. There is 

moderate stool in the colon. Appendix is normal. There is endometrial 

thickening in the uterus. There are small cysts or follicles in both 

ovaries, cyst on the left measures 2.5 cm. On the right side this cyst 

measures 2 cm. Bladder is mildly distended.

 

IMPRESSION:

1. Mild endometrial thickening although possibly related to the phase of 

menstrual cycle if patient is still menstruating.

2. Bilateral small ovarian cysts or follicles.

3. Normal appendix.

4. No bowel obstruction.

5. Moderate stool in the colon.

 

 

 

 

 

 

 

 

 

RS Compliance Statement:

 

One or more of the following individualized dose reduction techniques were

utilized for this examination:  

1. Automated exposure control  

2. Adjustment of the mA and/or kV according to patient size  

3. Use of iterative reconstruction technique

 

Electronically signed by: Miguelito Campos MD (9/7/2019 2:48 AM) [facility]

## 2019-09-07 NOTE — RAD
CT brain without contrast.

 

HISTORY: Headache

 

 

 

CT scan of brain was done without contrast. There is no intracranial 

hemorrhage or subdural hematoma. There is no mass or shift of the midline.

There is a mucous retention cyst in the right maxillary antrum. Sinuses 

are otherwise clear. Ventricles are normal in size. There are no abnormal 

areas of increased or decreased attenuation.

 

IMPRESSION:

1. No intracranial hemorrhage or acute finding noted. If symptoms persist 

MRI could be of benefit.

 

 

 

 

 

 

 

 

 

 

RS Compliance Statement:

 

One or more of the following individualized dose reduction techniques were

utilized for this examination:  

1. Automated exposure control  

2. Adjustment of the mA and/or kV according to patient size  

3. Use of iterative reconstruction technique

 

Electronically signed by: Miguelito Campos MD (9/7/2019 1:37 AM) [facility]

## 2019-09-07 NOTE — PHYS DOC
Past Medical History


Past Medical History:  Diabetes-Type II, Hypertension, Hepatitis, Other


Additional Past Medical Histor:  HEP C, OCD,ADHD, ENCEPHALITIS, PLEURISEY


Past Surgical History:  


Alcohol Use:  None


Drug Use:  Marijuana, Methamphetamine





Adult General


Chief Complaint


Chief Complaint:  HYPERTENSION





HPI


HPI





Patient is a 49  year old [f__sex] who presents with []





Review of Systems


Review of Systems





Constitutional: Denies fever or chills []


Eyes: Denies change in visual acuity, redness, or eye pain []


HENT: Denies nasal congestion or sore throat []


Respiratory: Denies cough or shortness of breath []


Cardiovascular: No additional information not addressed in HPI []


GI: Denies abdominal pain, nausea, vomiting, bloody stools or diarrhea []


: Denies dysuria or hematuria []


Musculoskeletal: Denies back pain or joint pain []


Integument: Denies rash or skin lesions []


Neurologic: Denies headache, focal weakness or sensory changes []


Endocrine: Denies polyuria or polydipsia []





All other systems were reviewed and found to be within normal limits, except as 

documented in this note.





Current Medications


Current Medications





Current Medications








 Medications


  (Trade)  Dose


 Ordered  Sig/Curtis  Start Time


 Stop Time Status Last Admin


Dose Admin


 


 Clonidine HCl


  (Catapres)  0.1 mg  1X  ONCE  19 01:30


 19 01:31 DC 19 01:38


0.1 MG


 


 Hydralazine HCl


  (Apresoline Inj)  20 mg  1X  ONCE  19 03:00


 19 03:01 DC  





 


 Info


  (CONTRAST GIVEN


 -- Rx MONITORING)  1 each  PRN DAILY  PRN  19 01:30


 19 04:39 DC  





 


 Iohexol


  (Omnipaque 300


 Mg/ml)  75 ml  1X  ONCE  19 01:30


 19 01:31 DC 19 01:26


75 ML


 


 Ketorolac


 Tromethamine


  (Toradol 15mg


 Vial)  15 mg  1X  ONCE  19 01:30


 19 01:31 DC 19 01:38


15 MG


 


 Labetalol HCl


  (Normodyne Iv


 Push)  10 mg  1X  ONCE  19 00:30


 19 00:31 DC 19 00:37


10 MG


 


 Ondansetron HCl


  (Zofran)  4 mg  1X  ONCE  19 00:30


 19 00:31 DC 19 00:46


4 MG


 


 Sodium Chloride  1,000 ml @ 


 1,000 mls/hr  1X  ONCE  19 00:30


 19 01:29 DC 19 00:46


1,000 MLS/HR











Allergies


Allergies





Allergies








Coded Allergies Type Severity Reaction Last Updated Verified


 


  No Known Drug Allergies    17 No











Physical Exam


Physical Exam





Constitutional: Well developed, well nourished, no acute distress, non-toxic 

appearance. []


HENT: Normocephalic, atraumatic, bilateral external ears normal, oropharynx 

moist, no oral exudates, nose normal. []


Eyes: PERRLA, EOMI, conjunctiva normal, no discharge. [] 


Neck: Normal range of motion, no tenderness, supple, no stridor. [] 


Cardiovascular:Heart rate regular rhythm, no murmur []


Lungs & Thorax:  Bilateral breath sounds clear to auscultation []


Abdomen: Bowel sounds normal, soft, no tenderness, no masses, no pulsatile 

masses. [] 


Skin: Warm, dry, no erythema, no rash. [] 


Back: No tenderness, no CVA tenderness. [] 


Extremities: No tenderness, no cyanosis, no clubbing, ROM intact, no edema. [] 


Neurologic: Alert and oriented X 3, normal motor function, normal sensory 

function, no focal deficits noted. []


Psychologic: Affect normal, judgement normal, mood normal. []





Current Patient Data


Vital Signs





                                   Vital Signs








  Date Time  Temp Pulse Resp B/P (MAP) Pulse Ox O2 Delivery O2 Flow Rate FiO2


 


19 04:09  94 16  97   


 


19 01:38    226/109    


 


19 00:05 98.3     Room Air  





 98.3       








Lab Values





                                Laboratory Tests








Test


 19


00:40 19


02:10


 


White Blood Count


 7.8 x10^3/uL


(4.0-11.0) 





 


Red Blood Count


 5.42 x10^6/uL


(3.50-5.40)  H 





 


Hemoglobin


 12.4 g/dL


(12.0-15.5) 





 


Hematocrit


 38.4 %


(36.0-47.0) 





 


Mean Corpuscular Volume


 71 fL ()


L 





 


Mean Corpuscular Hemoglobin


 23 pg (25-35)


L 





 


Mean Corpuscular Hemoglobin


Concent 32 g/dL


(31-37) 





 


Red Cell Distribution Width


 21.8 %


(11.5-14.5)  H 





 


Platelet Count


 191 x10^3/uL


(140-400) 





 


Neutrophils (%) (Auto) 67 % (31-73)   


 


Lymphocytes (%) (Auto) 23 % (24-48)  L 


 


Monocytes (%) (Auto) 8 % (0-9)   


 


Eosinophils (%) (Auto) 1 % (0-3)   


 


Basophils (%) (Auto) 0 % (0-3)   


 


Neutrophils # (Auto)


 5.2 x10^3/uL


(1.8-7.7) 





 


Lymphocytes # (Auto)


 1.8 x10^3/uL


(1.0-4.8) 





 


Monocytes # (Auto)


 0.6 x10^3/uL


(0.0-1.1) 





 


Eosinophils # (Auto)


 0.1 x10^3/uL


(0.0-0.7) 





 


Basophils # (Auto)


 0.0 x10^3/uL


(0.0-0.2) 





 


Platelet Estimate


 Adequate


(ADEQUATE) 





 


Microcytosis Slight   


 


Macrocytosis Slight   


 


Sodium Level


 137 mmol/L


(136-145) 





 


Potassium Level


 4.3 mmol/L


(3.5-5.1) 





 


Chloride Level


 99 mmol/L


() 





 


Carbon Dioxide Level


 29 mmol/L


(21-32) 





 


Anion Gap 9 (6-14)   


 


Blood Urea Nitrogen


 8 mg/dL (7-20)


 





 


Creatinine


 0.8 mg/dL


(0.6-1.0) 





 


Estimated GFR


(Cockcroft-Gault) 75.9  


 





 


BUN/Creatinine Ratio 10 (6-20)   


 


Glucose Level


 241 mg/dL


(70-99)  H 





 


Lactic Acid Level


 1.6 mmol/L


(0.4-2.0) 





 


Calcium Level


 8.7 mg/dL


(8.5-10.1) 





 


Magnesium Level


 1.8 mg/dL


(1.8-2.4) 





 


Total Bilirubin


 0.5 mg/dL


(0.2-1.0) 





 


Aspartate Amino Transferase


(AST) 86 U/L (15-37)


H 





 


Alanine Aminotransferase (ALT)


 127 U/L


(14-59)  H 





 


Alkaline Phosphatase


 127 U/L


()  H 





 


Creatine Kinase


 59 U/L


() 





 


Creatine Kinase MB (Mass)


 1.0 ng/mL


(0.0-3.6) 





 


Creatine Kinase MB Relative


Index  % (0-4)  


 





 


Troponin I Quantitative


 < 0.017 ng/mL


(0.000-0.055) 





 


Total Protein


 7.9 g/dL


(6.4-8.2) 





 


Albumin


 3.1 g/dL


(3.4-5.0)  L 





 


Albumin/Globulin Ratio


 0.6 (1.0-1.7)


L 





 


Lipase


 118 U/L


() 





 


Ethyl Alcohol Level


 < 10 mg/dL


(0-10) 





 


Urine Collection Type  Void  


 


Urine Color  Yellow  


 


Urine Clarity  Clear  


 


Urine pH  6.5  


 


Urine Specific Gravity  >=1.030  


 


Urine Protein


 


 Negative mg/dL


(NEG-TRACE)


 


Urine Glucose (UA)


 


 500 mg/dL


(NEG)


 


Urine Ketones (Stick)


 


 Negative mg/dL


(NEG)


 


Urine Blood


 


 Negative (NEG)





 


Urine Nitrite


 


 Negative (NEG)





 


Urine Bilirubin


 


 Negative (NEG)





 


Urine Urobilinogen Dipstick


 


 0.2 mg/dL (0.2


mg/dL)


 


Urine Leukocyte Esterase


 


 Negative (NEG)





 


Urine RBC


 


 Occ /HPF (0-2)





 


Urine WBC


 


 Occ /HPF (0-4)





 


Urine Squamous Epithelial


Cells 


 Mod /LPF  





 


Urine Bacteria


 


 Few /HPF


(0-FEW)





                                Laboratory Tests


19 00:40








                                Laboratory Tests


19 00:40














EKG


EKG


@0030 NSR at 91bpm, NO ST elevation, incomplete RBBB





Radiology/Procedures


Radiology/Procedures


[]





Course & Med Decision Making


Course & Med Decision Making


Pertinent Labs and Imaging studies reviewed. (See chart for details)





[]





Dragon Disclaimer


Dragon Disclaimer


This electronic medical record was generated, in whole or in part, using a voice

 recognition dictation system.





Departure


Departure


Impression:  


   Primary Impression:  


   Hypertensive urgency


   Additional Impression:  


   Elevated LFTs


Disposition:   HOME, SELF-CARE


Condition:  STABLE


Referrals:  


NO PCP (PCP)


Patient Instructions:  Hypertension, Easy-to-Read





Additional Instructions:  


Your liver enzymes are still slightly elevated.  They have improved since you 

were last seen in the Emergency Department here back in 2019.  You need 

to follow with your doctor or be seen in a free clinic for further evaluation 

and treatment.  They may have programs to help you get the medication you need.


Scripts


Clonidine Hcl (CLONIDINE HCL) 0.1 Mg Tablet


0.1 MG PO BID PRN for ELEVATED BP, SEE COMMENTS, #20 TAB


   Take for systolic blood pressure (top number) greater than


   185 and/or diastolic blood pressure (bottom number) greater


   than 110.


   Prov: CHELSIE MCKEON DO         19





Problem Qualifiers











CHELSIE MCKEON DO              Sep 7, 2019 03:56

## 2019-09-09 NOTE — EKG
[facility]

              [address] [place] [address]

Test Date:    2019               Test Time:    00:30:01

Pat Name:     PIPO HAQ              Department:   

Patient ID:   [id number]           Room:          

Gender:       F                        Technician:   

:          [id number]               Requested By: CHELSIE MCKEON

Order Number: [id number]           Reading MD:     

                                 Measurements

Intervals                              Axis          

Rate:         91                       P:            56

HI:           144                      QRS:          49

QRSD:         84                       T:            65

QT:           362                                    

QTc:          447                                    

                           Interpretive Statements

SINUS RHYTHM

LEFT ATRIAL ABNORMALITY

INCOMPLETE RIGHT BUNDLE BRANCH BLOCK

QRS(T) CONTOUR ABNORMALITY

CONSIDER ANTEROLATERAL MYOCARDIAL DAMAGE

ABNORMAL ECG

RI6.01          Unconfirmed report

No previous ECG available for comparison

## 2020-02-09 ENCOUNTER — HOSPITAL ENCOUNTER (EMERGENCY)
Dept: HOSPITAL 61 - ER | Age: 50
Discharge: HOME | End: 2020-02-09
Payer: SELF-PAY

## 2020-02-09 VITALS — HEIGHT: 62 IN | BODY MASS INDEX: 39.35 KG/M2 | WEIGHT: 213.85 LBS

## 2020-02-09 VITALS — SYSTOLIC BLOOD PRESSURE: 172 MMHG | DIASTOLIC BLOOD PRESSURE: 76 MMHG

## 2020-02-09 DIAGNOSIS — Z98.51: ICD-10-CM

## 2020-02-09 DIAGNOSIS — F17.200: ICD-10-CM

## 2020-02-09 DIAGNOSIS — Z98.890: ICD-10-CM

## 2020-02-09 DIAGNOSIS — R05: ICD-10-CM

## 2020-02-09 DIAGNOSIS — R11.2: ICD-10-CM

## 2020-02-09 DIAGNOSIS — I10: ICD-10-CM

## 2020-02-09 DIAGNOSIS — F12.90: ICD-10-CM

## 2020-02-09 DIAGNOSIS — F90.9: ICD-10-CM

## 2020-02-09 DIAGNOSIS — R10.11: Primary | ICD-10-CM

## 2020-02-09 DIAGNOSIS — F15.90: ICD-10-CM

## 2020-02-09 DIAGNOSIS — E11.9: ICD-10-CM

## 2020-02-09 LAB
ALBUMIN SERPL-MCNC: 3.6 G/DL (ref 3.4–5)
ALBUMIN/GLOB SERPL: 0.7 {RATIO} (ref 1–1.7)
ALP SERPL-CCNC: 132 U/L (ref 46–116)
ALT SERPL-CCNC: 179 U/L (ref 14–59)
ANION GAP SERPL CALC-SCNC: 10 MMOL/L (ref 6–14)
APTT PPP: (no result) S
AST SERPL-CCNC: 105 U/L (ref 15–37)
BACTERIA #/AREA URNS HPF: (no result) /HPF
BASOPHILS # BLD AUTO: 0 X10^3/UL (ref 0–0.2)
BASOPHILS NFR BLD: 0 % (ref 0–3)
BILIRUB SERPL-MCNC: 0.5 MG/DL (ref 0.2–1)
BILIRUB UR QL STRIP: NEGATIVE
BUN SERPL-MCNC: 12 MG/DL (ref 7–20)
BUN/CREAT SERPL: 15 (ref 6–20)
CALCIUM SERPL-MCNC: 9.6 MG/DL (ref 8.5–10.1)
CHLORIDE SERPL-SCNC: 98 MMOL/L (ref 98–107)
CO2 SERPL-SCNC: 29 MMOL/L (ref 21–32)
CREAT SERPL-MCNC: 0.8 MG/DL (ref 0.6–1)
EOSINOPHIL NFR BLD: 0.1 X10^3/UL (ref 0–0.7)
EOSINOPHIL NFR BLD: 1 % (ref 0–3)
ERYTHROCYTE [DISTWIDTH] IN BLOOD BY AUTOMATED COUNT: 16.6 % (ref 11.5–14.5)
FIBRINOGEN PPP-MCNC: CLEAR MG/DL
GFR SERPLBLD BASED ON 1.73 SQ M-ARVRAT: 75.9 ML/MIN
GLOBULIN SER-MCNC: 5.5 G/DL (ref 2.2–3.8)
GLUCOSE SERPL-MCNC: 212 MG/DL (ref 70–99)
HCT VFR BLD CALC: 44.7 % (ref 36–47)
HGB BLD-MCNC: 14.6 G/DL (ref 12–15.5)
LIPASE: 127 U/L (ref 73–393)
LYMPHOCYTES # BLD: 2.2 X10^3/UL (ref 1–4.8)
LYMPHOCYTES NFR BLD AUTO: 24 % (ref 24–48)
MAGNESIUM SERPL-MCNC: 1.9 MG/DL (ref 1.8–2.4)
MCH RBC QN AUTO: 25 PG (ref 25–35)
MCHC RBC AUTO-ENTMCNC: 33 G/DL (ref 31–37)
MCV RBC AUTO: 77 FL (ref 79–100)
MONO #: 0.7 X10^3/UL (ref 0–1.1)
MONOCYTES NFR BLD: 7 % (ref 0–9)
NEUT #: 6.4 X10^3/UL (ref 1.8–7.7)
NEUTROPHILS NFR BLD AUTO: 68 % (ref 31–73)
NITRITE UR QL STRIP: NEGATIVE
PH UR STRIP: 6 [PH]
PLATELET # BLD AUTO: 230 X10^3/UL (ref 140–400)
POTASSIUM SERPL-SCNC: 4.1 MMOL/L (ref 3.5–5.1)
PROT SERPL-MCNC: 9.1 G/DL (ref 6.4–8.2)
PROT UR STRIP-MCNC: NEGATIVE MG/DL
RBC # BLD AUTO: 5.79 X10^6/UL (ref 3.5–5.4)
RBC #/AREA URNS HPF: 0 /HPF (ref 0–2)
SODIUM SERPL-SCNC: 137 MMOL/L (ref 136–145)
SQUAMOUS #/AREA URNS LPF: (no result) /LPF
UROBILINOGEN UR-MCNC: 1 MG/DL
WBC # BLD AUTO: 9.4 X10^3/UL (ref 4–11)
WBC #/AREA URNS HPF: (no result) /HPF (ref 0–4)

## 2020-02-09 PROCEDURE — 96374 THER/PROPH/DIAG INJ IV PUSH: CPT

## 2020-02-09 PROCEDURE — 99285 EMERGENCY DEPT VISIT HI MDM: CPT

## 2020-02-09 PROCEDURE — 96375 TX/PRO/DX INJ NEW DRUG ADDON: CPT

## 2020-02-09 PROCEDURE — 87086 URINE CULTURE/COLONY COUNT: CPT

## 2020-02-09 PROCEDURE — 81001 URINALYSIS AUTO W/SCOPE: CPT

## 2020-02-09 PROCEDURE — 85025 COMPLETE CBC W/AUTO DIFF WBC: CPT

## 2020-02-09 PROCEDURE — 96361 HYDRATE IV INFUSION ADD-ON: CPT

## 2020-02-09 PROCEDURE — 36415 COLL VENOUS BLD VENIPUNCTURE: CPT

## 2020-02-09 PROCEDURE — 80053 COMPREHEN METABOLIC PANEL: CPT

## 2020-02-09 PROCEDURE — 76705 ECHO EXAM OF ABDOMEN: CPT

## 2020-02-09 PROCEDURE — 83690 ASSAY OF LIPASE: CPT

## 2020-02-09 PROCEDURE — 71046 X-RAY EXAM CHEST 2 VIEWS: CPT

## 2020-02-09 PROCEDURE — 83735 ASSAY OF MAGNESIUM: CPT

## 2020-02-09 PROCEDURE — 74177 CT ABD & PELVIS W/CONTRAST: CPT

## 2020-02-09 PROCEDURE — 82140 ASSAY OF AMMONIA: CPT

## 2020-02-09 NOTE — RAD
Exam performed: Limited right upper quadrant sonogram. 

 

Indication: Abdominal pain, hepatitis C 

 

Date of Service: 2/9/2020 . Comparison: CT abdomen and pelvis from earlier

today

 

Technique: Real-time grayscale imaging of the right upper abdomen is 

performed and images are obtained.  

 

Findings:

 

Hepatomegaly with heterogeneous hepatic echotexture. The liver measures 

18.9 cm in length. There is no intra or extrahepatic biliary ductal 

dilatation.  The gallbladder is well distended without any shadowing 

intraluminal calculus, pericholecystic fluid or gallbladder wall 

thickening.  The gallbladder wall measures 1.6 mm.  The common bile duct 

measures 4.6 mm.  

The right kidney appears unremarkable and measures 10.2 cm in length.  

There is no hydronephrosis or perinephric fluid collection. 

 

The visualized pancreas , aorta and IVC appear unremarkable. 

 

Impression:

 

1. Hepatomegaly, otherwise unremarkable right upper quadrant sonogram.  

 

Electronically signed by: Brandi Richardson MD (2/9/2020 2:39 PM) [facility]

## 2020-02-09 NOTE — RAD
Exam performed: 2 views of the chest. 

 

Indication: Cough

 

Date of Service: 2/9/2020 1:17 PM . Comparison : One view chest from 

8/1/2019

 

Findings:

 

PA and lateral radiographs of the chest reveal a normal cardiomediastinal 

contour. The lungs are clear. No pleural fluid is seen.  The visualized 

osseous structures are unremarkable.

 

Impression: 

 No acute cardiopulmonary process seen.

 

Electronically signed by: Brandi Richardson MD (2/9/2020 1:47 PM) [facility]

## 2020-02-09 NOTE — PHYS DOC
Past Medical History


Past Medical History:  Diabetes-Type II, Hypertension, Hepatitis, Other


Additional Past Medical Histor:  OCD,ADHD, ENCEPHALITIS, PLEURISEY


Past Surgical History:  , Tubal ligation


Smoking Status:  Current Every Day Smoker


Alcohol Use:  None


Drug Use:  Marijuana, Methamphetamine





Adult General


Chief Complaint


Chief Complaint:  ABDOMINAL PAIN





HPI


HPI





Patient is a 49  year old female who presents with RUQ abdominal pain that has 

been ongoing for 2 days. The patient has also been having associated symptoms of

nausea and vomiting. The patient has also been having cough. The patient has 

been having severe pain that she rates 10/10. The patient has a past history of 

liver issues.





Review of Systems


Review of Systems





Constitutional: Denies fever or chills []


Eyes: Denies change in visual acuity, redness, or eye pain []


HENT: Denies nasal congestion or sore throat []


Respiratory: Denies cough or shortness of breath []


Cardiovascular: No additional information not addressed in HPI []


GI: Reports abdominal pain, nausea, vomiting, Denies bloody stools or diarrhea 

[]


: Denies dysuria or hematuria []


Musculoskeletal: Denies back pain or joint pain []


Integument: Denies rash or skin lesions []


Neurologic: Denies headache, focal weakness or sensory changes []


Endocrine: Denies polyuria or polydipsia []





Complete systems were reviewed and found to be within normal limits, except as 

documented in this note.





Current Medications


Current Medications





Current Medications








 Medications


  (Trade)  Dose


 Ordered  Sig/Curtis  Start Time


 Stop Time Status Last Admin


Dose Admin


 


 Fentanyl Citrate


  (Fentanyl 2ml


 Vial)  75 mcg  1X  STAT  20 14:19


 20 14:21 DC 20 14:35


75 MCG


 


 Info


  (CONTRAST GIVEN


 -- Rx MONITORING)  1 each  PRN DAILY  PRN  20 13:45


 20 13:44   





 


 Iohexol


  (Omnipaque 300


 Mg/ml)  75 ml  1X  ONCE  20 13:45


 20 13:46 DC 20 14:01


75 ML


 


 Morphine Sulfate


  (Morphine


 Sulfate)  5 mg  1X  STAT  20 13:17


 20 13:22 DC 20 13:43


5 MG


 


 Ondansetron HCl


  (Zofran)  4 mg  1X  ONCE  20 13:30


 20 13:31 DC 20 13:43


4 MG


 


 Sodium Chloride  1,000 ml @ 


 1,000 mls/hr  1X  ONCE  20 13:30


 20 14:29 DC 20 13:42


1,000 MLS/HR











Allergies


Allergies





Allergies








Coded Allergies Type Severity Reaction Last Updated Verified


 


  No Known Drug Allergies    17 No











Physical Exam


Physical Exam





Constitutional: Well developed, well nourished, no acute distress, non-toxic 

appearance. []


HENT: Normocephalic, atraumatic, bilateral external ears normal, oropharynx 

moist, no oral exudates, nose normal. []


Eyes: PERRLA, EOMI, conjunctiva normal, no discharge. [] 


Neck: Normal range of motion, no tenderness, supple, no stridor. [] 


Cardiovascular:Heart rate regular rhythm, no murmur []


Lungs & Thorax:  Bilateral breath sounds clear to auscultation []


Abdomen: Bowel sounds normal, soft, RUQ tenderness, no masses, no pulsatile 

masses. [] 


Skin: Warm, dry, no erythema, no rash. [] 


Neurologic: Alert and oriented X 3, normal motor function, normal sensory 

function, no focal deficits noted. []


Psychologic: Affect normal, judgement normal, mood normal. []





Current Patient Data


Vital Signs





                                   Vital Signs








  Date Time  Temp Pulse Resp B/P (MAP) Pulse Ox O2 Delivery O2 Flow Rate FiO2


 


20 14:35  83 18 172/76 (108) 93 Room Air  


 


20 13:06 97.8       





 97.8       








Lab Values





                                Laboratory Tests








Test


 20


12:52 20


12:58


 


Urine Collection Type Unknown   


 


Urine Color Dk yellow   


 


Urine Clarity Clear   


 


Urine pH 6.0   


 


Urine Specific Gravity 1.025   


 


Urine Protein


 Negative mg/dL


(NEG-TRACE) 





 


Urine Glucose (UA)


 >=1000 mg/dL


(NEG) 





 


Urine Ketones (Stick)


 Negative mg/dL


(NEG) 





 


Urine Blood


 Negative (NEG)


 





 


Urine Nitrite


 Negative (NEG)


 





 


Urine Bilirubin


 Negative (NEG)


 





 


Urine Urobilinogen Dipstick


 1.0 mg/dL (0.2


mg/dL) 





 


Urine Leukocyte Esterase


 Negative (NEG)


 





 


Urine RBC 0 /HPF (0-2)   


 


Urine WBC


 Occ /HPF (0-4)


 





 


Urine Squamous Epithelial


Cells Many /LPF  


 





 


Urine Bacteria


 Many /HPF


(0-FEW) 





 


Urine Mucus Marked /LPF   


 


White Blood Count


 


 9.4 x10^3/uL


(4.0-11.0)


 


Red Blood Count


 


 5.79 x10^6/uL


(3.50-5.40)  H


 


Hemoglobin


 


 14.6 g/dL


(12.0-15.5)


 


Hematocrit


 


 44.7 %


(36.0-47.0)


 


Mean Corpuscular Volume


 


 77 fL ()


L


 


Mean Corpuscular Hemoglobin  25 pg (25-35)  


 


Mean Corpuscular Hemoglobin


Concent 


 33 g/dL


(31-37)


 


Red Cell Distribution Width


 


 16.6 %


(11.5-14.5)  H


 


Platelet Count


 


 230 x10^3/uL


(140-400)


 


Neutrophils (%) (Auto)  68 % (31-73)  


 


Lymphocytes (%) (Auto)  24 % (24-48)  


 


Monocytes (%) (Auto)  7 % (0-9)  


 


Eosinophils (%) (Auto)  1 % (0-3)  


 


Basophils (%) (Auto)  0 % (0-3)  


 


Neutrophils # (Auto)


 


 6.4 x10^3/uL


(1.8-7.7)


 


Lymphocytes # (Auto)


 


 2.2 x10^3/uL


(1.0-4.8)


 


Monocytes # (Auto)


 


 0.7 x10^3/uL


(0.0-1.1)


 


Eosinophils # (Auto)


 


 0.1 x10^3/uL


(0.0-0.7)


 


Basophils # (Auto)


 


 0.0 x10^3/uL


(0.0-0.2)


 


Sodium Level


 


 137 mmol/L


(136-145)


 


Potassium Level


 


 4.1 mmol/L


(3.5-5.1)


 


Chloride Level


 


 98 mmol/L


()


 


Carbon Dioxide Level


 


 29 mmol/L


(21-32)


 


Anion Gap  10 (6-14)  


 


Blood Urea Nitrogen


 


 12 mg/dL


(7-20)


 


Creatinine


 


 0.8 mg/dL


(0.6-1.0)


 


Estimated GFR


(Cockcroft-Gault) 


 75.9  





 


BUN/Creatinine Ratio  15 (6-20)  


 


Glucose Level


 


 212 mg/dL


(70-99)  H


 


Calcium Level


 


 9.6 mg/dL


(8.5-10.1)


 


Magnesium Level


 


 1.9 mg/dL


(1.8-2.4)


 


Total Bilirubin


 


 0.5 mg/dL


(0.2-1.0)


 


Aspartate Amino Transferase


(AST) 


 105 U/L


(15-37)  H


 


Alanine Aminotransferase (ALT)


 


 179 U/L


(14-59)  H


 


Alkaline Phosphatase


 


 132 U/L


()  H


 


Ammonia


 


 < 10 mcmol/L


(11-34)  L


 


Total Protein


 


 9.1 g/dL


(6.4-8.2)  H


 


Albumin


 


 3.6 g/dL


(3.4-5.0)


 


Albumin/Globulin Ratio


 


 0.7 (1.0-1.7)


L


 


Lipase


 


 127 U/L


()





                                Laboratory Tests


20 12:58








                                Laboratory Tests


20 12:58











EKG


EKG


[]





Radiology/Procedures


Radiology/Procedures


                                        


                           [facility]


                    [address] [place] [address]


                                 [phone]


                                        


                                 IMAGING REPORT





                                     Signed





PATIENT: PIPO HAQ    ACCOUNT: [id number]     MRN#: [id number]


: 1970           LOCATION: ER              AGE: 49


SEX: F                    EXAM DT: 20         ACCESSION#: [id number]


STATUS: REG ER            ORD. PHYSICIAN: CHELSIE SHAH


REASON: RUQ abdominal pain, n, v


PROCEDURE: ABDOMEN LTD





 


Exam performed: Limited right upper quadrant sonogram. 


 


Indication: Abdominal pain, hepatitis C 


 


Date of Service: 2020 . Comparison: CT abdomen and pelvis from earlier


today


 


Technique: Real-time grayscale imaging of the right upper abdomen is 


performed and images are obtained.  


 


Findings:


 


Hepatomegaly with heterogeneous hepatic echotexture. The liver measures 


18.9 cm in length. There is no intra or extrahepatic biliary ductal 


dilatation.  The gallbladder is well distended without any shadowing 


intraluminal calculus, pericholecystic fluid or gallbladder wall 


thickening.  The gallbladder wall measures 1.6 mm.  The common bile duct 


measures 4.6 mm.  


The right kidney appears unremarkable and measures 10.2 cm in length.  


There is no hydronephrosis or perinephric fluid collection. 


 


The visualized pancreas , aorta and IVC appear unremarkable. 


 


Impression:


 


1. Hepatomegaly, otherwise unremarkable right upper quadrant sonogram.  


 


Electronically signed by: Brandi Richardson MD (2020 2:39 PM) [facility]














DICTATED and SIGNED BY:     BRANDI RICHARDSON MD


DATE:     20 1439





                                        


                                        


                            [facility]


                    [address] [place] [address]


                                 [phone]


                                        


                                 IMAGING REPORT





                                     Signed





PATIENT: PIPO HAQ    ACCOUNT: [id number]     MRN#: [id number]


: 1970           LOCATION: ER              AGE: 49


SEX: F                    EXAM DT: 20         ACCESSION#: [id number]


STATUS: REG ER            ORD. PHYSICIAN: CHELSIE SHAH


REASON: cough


PROCEDURE: CHEST PA & LATERAL





 


 Exam performed: 2 views of the chest. 


 


Indication: Cough


 


Date of Service: 2020 1:17 PM . Comparison : One view chest from 


2019


 


Findings:


 


PA and lateral radiographs of the chest reveal a normal cardiomediastinal 


contour. The lungs are clear. No pleural fluid is seen.  The visualized 


osseous structures are unremarkable.


 


Impression: 


 No acute cardiopulmonary process seen.


 


Electronically signed by: Brandi Richardson MD (2020 1:47 PM) [facility]














DICTATED and SIGNED BY:     BRANDI RICHARDSON MD


DATE:     20 1347














[][facility]


                    [address] [place] [address]


                                 [phone]


                                        


                                 IMAGING REPORT





                                     Signed





PATIENT: PIPO HAQ    ACCOUNT: [id number]     MRN#: [id number]


: 1970           LOCATION: ER              AGE: 49


SEX: F                    EXAM DT: 20         ACCESSION#: [id number]


STATUS: REG ER            ORD. PHYSICIAN: CHELSIE SHAH


REASON: RUQ abdominal pain


PROCEDURE: CT ABD PELV W/ IV CONTRST ONLY





Exam performed: CT scan of the abdomen and pelvis with contrast


 


Clinical Indication:Right upper quadrant abdominal pain 


 


Date of Service: 2019 comparison: CT abdomen and pelvis from 


2019. Technique: Contiguous helical acquisitions are obtained


from the lung bases to the pelvis during intravenous administration of [75


cc of Omnipaque 300].   Sagittal and coronal reformatted images were 


obtained and reviewed. 


 


CT abdomen findings:


 


The lung bases appear essentially clear. The visualized heart is normal.


 


There are several tiny low attenuating nodules in the liver, stable since 


prior exams likely cysts. The  spleen is mildly enlarged, however stable. 


Gall bladder and pancreas appears unremarkable.    Both adrenal glands and


bilateral kidneys appear normal with symmetric excretion of contrast via 


both kidneys. There is a small cyst in the right inferior renal pole. The 


small bowel loops appear nondilated and unremarkable. There is scattered 


stool in the right colon. Visualized appendix is normal. Aorta is normal 


in caliber. There is no retroperitoneal lymphadenopathy or mass lesions.  


The urinary bladder is well distended and normal .  Sigmoid diverticulosis


without diverticulitis. Uterus is anteverted. Trace fluid in the 


endometrial cavity. No adnexal masses seen.


Interrogation of bone windows demonstrates no obvious bony abnormality. 


 


Sagittal and coronal  reformatted images were obtained and reviewed which 


demonstrate no additional findings. 


 


Impression abdomen and pelvis :


 


 1. Mild hepatosplenomegaly.


2. Sigmoid diverticulosis without acute diverticulitis


 


 


PQRS Compliance Statement:


 


One or more of the following individualized dose reduction techniques were


utilized for this examination:


1. Automated exposure control


2. Adjustment of the mA and/or kV according to patient size


3. Use of iterative reconstruction technique


 


Electronically signed by: Brandi Richardson MD (2020 2:31 PM) [facility]














DICTATED and SIGNED BY:     BRANDI RICHARDSON MD


DATE:     20 1436





Course & Med Decision Making


Course & Med Decision Making


Pertinent Labs and Imaging studies reviewed. (See chart for details)





Will get labs, CT, Ultrasound, and chest x-ray. Will give supportive care.





Labs and imaging is unremarkable for acute changes. It does show mild 

hepatosplenomegaly and mildly elevated liver enzymes. Will have follow up with 

primary care.





Dragon Disclaimer


Dragon Disclaimer


This electronic medical record was generated, in whole or in part, using a voice

recognition dictation system.





Departure


Departure


Impression:  


   Primary Impression:  


   RUQ pain


Disposition:  01 HOME, SELF-CARE


Condition:  STABLE


Referrals:  


NO PCP (PCP)





Additional Instructions:  


Thank you for visiting [facility]. We appreciate you trusting us 

with your care. If any additional problems come up don't hesitate to return to 

visit us. Please follow up with your primary care provider so they can plan 

additional care if needed and know about the problem that you had. If symptoms 

worsen come back to the Emergency Department. Any concerning symptoms that start

such as chest pain, shortness of air, weakness or numbness on one side of the 

body, running high fevers or any other concerning symptoms return to the ER.











CHELSIE SHAH           2020 13:23

## 2020-02-09 NOTE — RAD
Exam performed: CT scan of the abdomen and pelvis with contrast

 

Clinical Indication:Right upper quadrant abdominal pain 

 

Date of Service: 2/19/2019 comparison: CT abdomen and pelvis from 

September 7, 2019. Technique: Contiguous helical acquisitions are obtained

from the lung bases to the pelvis during intravenous administration of [75

cc of Omnipaque 300].   Sagittal and coronal reformatted images were 

obtained and reviewed. 

 

CT abdomen findings:

 

The lung bases appear essentially clear. The visualized heart is normal.

 

There are several tiny low attenuating nodules in the liver, stable since 

prior exams likely cysts. The  spleen is mildly enlarged, however stable. 

Gall bladder and pancreas appears unremarkable.    Both adrenal glands and

bilateral kidneys appear normal with symmetric excretion of contrast via 

both kidneys. There is a small cyst in the right inferior renal pole. The 

small bowel loops appear nondilated and unremarkable. There is scattered 

stool in the right colon. Visualized appendix is normal. Aorta is normal 

in caliber. There is no retroperitoneal lymphadenopathy or mass lesions.  

The urinary bladder is well distended and normal .  Sigmoid diverticulosis

without diverticulitis. Uterus is anteverted. Trace fluid in the 

endometrial cavity. No adnexal masses seen.

Interrogation of bone windows demonstrates no obvious bony abnormality. 

 

Sagittal and coronal  reformatted images were obtained and reviewed which 

demonstrate no additional findings. 

 

Impression abdomen and pelvis :

 

 1. Mild hepatosplenomegaly.

2. Sigmoid diverticulosis without acute diverticulitis

 

 

PQRS Compliance Statement:

 

One or more of the following individualized dose reduction techniques were

utilized for this examination:

1. Automated exposure control

2. Adjustment of the mA and/or kV according to patient size

3. Use of iterative reconstruction technique

 

Electronically signed by: Brandi Richardson MD (2/9/2020 2:31 PM) [facility]

## 2020-04-17 ENCOUNTER — HOSPITAL ENCOUNTER (EMERGENCY)
Dept: HOSPITAL 61 - ER | Age: 50
Discharge: HOME | End: 2020-04-17
Payer: SELF-PAY

## 2020-04-17 VITALS — HEIGHT: 62 IN | BODY MASS INDEX: 40.57 KG/M2 | WEIGHT: 220.46 LBS

## 2020-04-17 VITALS — SYSTOLIC BLOOD PRESSURE: 209 MMHG | DIASTOLIC BLOOD PRESSURE: 101 MMHG

## 2020-04-17 DIAGNOSIS — F17.200: ICD-10-CM

## 2020-04-17 DIAGNOSIS — H81.10: Primary | ICD-10-CM

## 2020-04-17 DIAGNOSIS — I10: ICD-10-CM

## 2020-04-17 DIAGNOSIS — E11.9: ICD-10-CM

## 2020-04-17 DIAGNOSIS — R11.2: ICD-10-CM

## 2020-04-17 LAB
ALBUMIN SERPL-MCNC: 3.3 G/DL (ref 3.4–5)
ALBUMIN/GLOB SERPL: 0.7 {RATIO} (ref 1–1.7)
ALP SERPL-CCNC: 122 U/L (ref 46–116)
ALT SERPL-CCNC: 118 U/L (ref 14–59)
ANION GAP SERPL CALC-SCNC: 5 MMOL/L (ref 6–14)
APTT PPP: YELLOW S
AST SERPL-CCNC: 91 U/L (ref 15–37)
BACTERIA #/AREA URNS HPF: (no result) /HPF
BASOPHILS # BLD AUTO: 0 X10^3/UL (ref 0–0.2)
BASOPHILS NFR BLD: 0 % (ref 0–3)
BILIRUB SERPL-MCNC: 0.3 MG/DL (ref 0.2–1)
BILIRUB UR QL STRIP: NEGATIVE
BUN SERPL-MCNC: 9 MG/DL (ref 7–20)
BUN/CREAT SERPL: 9 (ref 6–20)
CALCIUM SERPL-MCNC: 8.8 MG/DL (ref 8.5–10.1)
CHLORIDE SERPL-SCNC: 102 MMOL/L (ref 98–107)
CO2 SERPL-SCNC: 30 MMOL/L (ref 21–32)
CREAT SERPL-MCNC: 1 MG/DL (ref 0.6–1)
EOSINOPHIL NFR BLD: 0.1 X10^3/UL (ref 0–0.7)
EOSINOPHIL NFR BLD: 2 % (ref 0–3)
ERYTHROCYTE [DISTWIDTH] IN BLOOD BY AUTOMATED COUNT: 17.6 % (ref 11.5–14.5)
FIBRINOGEN PPP-MCNC: CLEAR MG/DL
GFR SERPLBLD BASED ON 1.73 SQ M-ARVRAT: 58.7 ML/MIN
GLOBULIN SER-MCNC: 4.6 G/DL (ref 2.2–3.8)
GLUCOSE SERPL-MCNC: 201 MG/DL (ref 70–99)
HCT VFR BLD CALC: 40.5 % (ref 36–47)
HGB BLD-MCNC: 13.1 G/DL (ref 12–15.5)
LYMPHOCYTES # BLD: 1.7 X10^3/UL (ref 1–4.8)
LYMPHOCYTES NFR BLD AUTO: 40 % (ref 24–48)
MCH RBC QN AUTO: 25 PG (ref 25–35)
MCHC RBC AUTO-ENTMCNC: 32 G/DL (ref 31–37)
MCV RBC AUTO: 77 FL (ref 79–100)
MONO #: 0.3 X10^3/UL (ref 0–1.1)
MONOCYTES NFR BLD: 8 % (ref 0–9)
NEUT #: 2.1 X10^3/UL (ref 1.8–7.7)
NEUTROPHILS NFR BLD AUTO: 49 % (ref 31–73)
NITRITE UR QL STRIP: NEGATIVE
PH UR STRIP: 7.5 [PH]
PLATELET # BLD AUTO: 142 X10^3/UL (ref 140–400)
POTASSIUM SERPL-SCNC: 3.6 MMOL/L (ref 3.5–5.1)
PROT SERPL-MCNC: 7.9 G/DL (ref 6.4–8.2)
PROT UR STRIP-MCNC: NEGATIVE MG/DL
RBC # BLD AUTO: 5.25 X10^6/UL (ref 3.5–5.4)
RBC #/AREA URNS HPF: 0 /HPF (ref 0–2)
SODIUM SERPL-SCNC: 137 MMOL/L (ref 136–145)
SQUAMOUS #/AREA URNS LPF: (no result) /LPF
UROBILINOGEN UR-MCNC: 0.2 MG/DL
WBC # BLD AUTO: 4.2 X10^3/UL (ref 4–11)
WBC #/AREA URNS HPF: (no result) /HPF (ref 0–4)

## 2020-04-17 PROCEDURE — 96374 THER/PROPH/DIAG INJ IV PUSH: CPT

## 2020-04-17 PROCEDURE — 99285 EMERGENCY DEPT VISIT HI MDM: CPT

## 2020-04-17 PROCEDURE — 80053 COMPREHEN METABOLIC PANEL: CPT

## 2020-04-17 PROCEDURE — 85025 COMPLETE CBC W/AUTO DIFF WBC: CPT

## 2020-04-17 PROCEDURE — 36415 COLL VENOUS BLD VENIPUNCTURE: CPT

## 2020-04-17 PROCEDURE — 81001 URINALYSIS AUTO W/SCOPE: CPT

## 2020-04-17 PROCEDURE — 96375 TX/PRO/DX INJ NEW DRUG ADDON: CPT

## 2020-04-17 PROCEDURE — 87086 URINE CULTURE/COLONY COUNT: CPT

## 2020-04-17 PROCEDURE — 96361 HYDRATE IV INFUSION ADD-ON: CPT

## 2020-04-17 NOTE — PHYS DOC
Past Medical History


Past Medical History:  Diabetes-Type II, Hypertension, Hepatitis, Other


Additional Past Medical Histor:  OCD,ADHD, ENCEPHALITIS, PLEURISEY


Past Surgical History:  , Tubal ligation


Smoking Status:  Current Every Day Smoker


Alcohol Use:  None


Drug Use:  Marijuana, Methamphetamine





General Adult


EDM:


Chief Complaint:  WEAKNESS/GENERALIZED





HPI:


HPI:





Patient is a 49  year old female who presents with complaint of dizziness along 

with nausea and vomiting. Patient states the dizziness is started this morning 

and then nausea and vomiting as well as diaphoresis that started just a couple 

of hours ago. Patient states the nausea is worsened when dizziness is worsened. 

She states the dizziness is worsened with change of position. She denies any 

abdominal pain. She denies any chest pain or shortness breath. She denies any 

cough or fever. Patient states that onset of symptoms was very sudden. She 

denies any lateralizing weakness or speech deficits.[]





Review of Systems:


Review of Systems:


Constitutional:  Denies fever or chills. []


Respiratory:  Denies cough or shortness of breath. [] 


Cardiovascular:  Denies chest pain or edema. [] 


GI:  Denies abdominal pain or diarrhea. Complains of nausea and vomiting. [] 


Neurologic:  Denies headache, focal weakness or sensory changes. Complains of 

dizziness. [] 





A full 10 point review of systems has been reviewed and is otherwise negative.





Heart Score:


Risk Factors:


Risk Factors:  DM, Current or recent (<one month) smoker, HTN, HLP, family 

history of CAD, obesity.


Risk Scores:


Score 0 - 3:  2.5% MACE over next 6 weeks - Discharge Home


Score 4 - 6:  20.3% MACE over next 6 weeks - Admit for Clinical Observation


Score 7 - 10:  72.7% MACE over next 6 weeks - Early Invasive Strategies





Current Medications:





Current Medications








 Medications


  (Trade)  Dose


 Ordered  Sig/Curtis  Start Time


 Stop Time Status Last Admin


Dose Admin


 


 Lorazepam


  (Ativan Inj)  1 mg  1X  ONCE  20 19:30


 20 19:31   





 


 Ondansetron HCl


  (Zofran)  4 mg  STK-MED ONCE  20 19:16


 20 19:16 DC  





 


 Scopolamine


  (Transderm-Scop)  1 patch  1X  ONCE  20 19:30


 20 19:31   





 


 Sodium Chloride  1,000 ml @ 


 1,000 mls/hr  Q1H  20 19:19


 20 20:18   














Allergies:


Allergies:





Allergies








Coded Allergies Type Severity Reaction Last Updated Verified


 


  No Known Drug Allergies    17 No











Physical Exam:


PE:





Constitutional: Well developed, well nourished, no acute distress, non-toxic 

appearance. []


HENT: Normocephalic, atraumatic, bilateral external ears normal, oropharynx 

moist, no oral exudates, nose normal. []


Eyes: PERRLA, EOMI, conjunctiva normal, no discharge. [] 


Neck: Normal range of motion, no tenderness, supple, no stridor. [] 


Cardiovascular:Heart rate regular rhythm, no murmur []


Lungs & Thorax:  Bilateral breath sounds clear to auscultation []


Abdomen: Bowel sounds normal, soft, no tenderness. [] 


Skin: Cool and diaphoretic, no erythema, no rash. [] 


Extremities: No tenderness, no cyanosis, no clubbing, ROM intact. [] 


Neurologic: Alert and oriented X 3, no focal deficits noted. []





EKG:


EKG:


[]





Radiology/Procedures:


Radiology/Procedures:


[]





Course & Med Decision Making:


Course & Med Decision Making


Pertinent Labs and Imaging studies reviewed. (See chart for details)





Patient moved to room upon arrival was evaluated by ER medical staff after which

 an IV was established and blood work was drawn. Patient given a dose of Zofran 

initially for the nausea. Patient continued to have nausea despite Zofran and 

ultimately was also given doses of Reglan and Benadryl. Patient also given a 

dose of Ativan as well as a Transderm scope patch. Patient reevaluated at 9:30 

PM and reports the symptoms are markedly improved. Workup has returned 

unremarkable and after findings reviewed with patient, patient discharged home.





Dragon Disclaimer:


Dragon Disclaimer:


This electronic medical record was generated, in whole or in part, using a voice

 recognition dictation system.





Departure


Departure


Impression:  


   Primary Impression:  


   Benign positional vertigo


   Qualified Codes:  H81.10 - Benign paroxysmal vertigo, unspecified ear


Disposition:   HOME, SELF-CARE


Condition:  STABLE


Referrals:  


NO PCP (PCP)


Patient Instructions:  Benign Positional Vertigo


Scripts


Ondansetron (ONDANSETRON ODT) 4 Mg Tab.rapdis


1 TAB PO PRN Q6-8HRS PRN for NAUSEA, #15 TAB


   Prov: LALITHA MERRITT Jr. DO         20 


Meclizine Hcl (MECLIZINE HCL) 25 Mg Tablet


25 MG PO PRN TID PRN for DIZZINESS, #30 TAB


   dizziness


   Prov: LALITHA MERRITT Jr. DO         20











LALITHA MERRITT Jr. DO          2020 19:31

## 2021-10-21 ENCOUNTER — HOSPITAL ENCOUNTER (INPATIENT)
Dept: HOSPITAL 61 - ER | Age: 51
LOS: 5 days | Discharge: HOME | DRG: 149 | End: 2021-10-26
Attending: INTERNAL MEDICINE | Admitting: INTERNAL MEDICINE
Payer: SELF-PAY

## 2021-10-21 VITALS — DIASTOLIC BLOOD PRESSURE: 86 MMHG | SYSTOLIC BLOOD PRESSURE: 172 MMHG

## 2021-10-21 VITALS — DIASTOLIC BLOOD PRESSURE: 114 MMHG | SYSTOLIC BLOOD PRESSURE: 181 MMHG

## 2021-10-21 VITALS — BODY MASS INDEX: 36.63 KG/M2 | HEIGHT: 62 IN | WEIGHT: 199.08 LBS

## 2021-10-21 DIAGNOSIS — I10: ICD-10-CM

## 2021-10-21 DIAGNOSIS — F32.A: ICD-10-CM

## 2021-10-21 DIAGNOSIS — F12.90: ICD-10-CM

## 2021-10-21 DIAGNOSIS — F17.200: ICD-10-CM

## 2021-10-21 DIAGNOSIS — R74.01: ICD-10-CM

## 2021-10-21 DIAGNOSIS — Z86.61: ICD-10-CM

## 2021-10-21 DIAGNOSIS — E87.6: ICD-10-CM

## 2021-10-21 DIAGNOSIS — Z91.19: ICD-10-CM

## 2021-10-21 DIAGNOSIS — Z86.73: ICD-10-CM

## 2021-10-21 DIAGNOSIS — F15.10: ICD-10-CM

## 2021-10-21 DIAGNOSIS — H81.10: ICD-10-CM

## 2021-10-21 DIAGNOSIS — F41.9: ICD-10-CM

## 2021-10-21 DIAGNOSIS — H81.399: ICD-10-CM

## 2021-10-21 DIAGNOSIS — F42.9: ICD-10-CM

## 2021-10-21 DIAGNOSIS — Z59.00: ICD-10-CM

## 2021-10-21 DIAGNOSIS — G92.8: ICD-10-CM

## 2021-10-21 DIAGNOSIS — J98.11: ICD-10-CM

## 2021-10-21 DIAGNOSIS — F90.9: ICD-10-CM

## 2021-10-21 DIAGNOSIS — E11.9: ICD-10-CM

## 2021-10-21 DIAGNOSIS — H81.22: Primary | ICD-10-CM

## 2021-10-21 DIAGNOSIS — I67.4: ICD-10-CM

## 2021-10-21 LAB
ALBUMIN SERPL-MCNC: 3.7 G/DL (ref 3.4–5)
ALBUMIN/GLOB SERPL: 0.7 {RATIO} (ref 1–1.7)
ALP SERPL-CCNC: 127 U/L (ref 46–116)
ALT SERPL-CCNC: 198 U/L (ref 14–59)
AMPHETAMINE/METHAMPHETAMINE: (no result)
ANION GAP SERPL CALC-SCNC: 11 MMOL/L (ref 6–14)
AST SERPL-CCNC: 136 U/L (ref 15–37)
BARBITURATES UR-MCNC: (no result) UG/ML
BASOPHILS # BLD AUTO: 0 X10^3/UL (ref 0–0.2)
BASOPHILS NFR BLD: 1 % (ref 0–3)
BENZODIAZ UR-MCNC: (no result) UG/L
BILIRUB SERPL-MCNC: 0.7 MG/DL (ref 0.2–1)
BUN SERPL-MCNC: 11 MG/DL (ref 7–20)
BUN/CREAT SERPL: 14 (ref 6–20)
CALCIUM SERPL-MCNC: 9.1 MG/DL (ref 8.5–10.1)
CANNABINOIDS UR-MCNC: (no result) UG/L
CHLORIDE SERPL-SCNC: 102 MMOL/L (ref 98–107)
CK SERPL-CCNC: 84 U/L (ref 26–192)
CO2 SERPL-SCNC: 27 MMOL/L (ref 21–32)
COCAINE UR-MCNC: (no result) NG/ML
CREAT SERPL-MCNC: 0.8 MG/DL (ref 0.6–1)
EOSINOPHIL NFR BLD: 0.1 X10^3/UL (ref 0–0.7)
EOSINOPHIL NFR BLD: 2 % (ref 0–3)
ERYTHROCYTE [DISTWIDTH] IN BLOOD BY AUTOMATED COUNT: 17.1 % (ref 11.5–14.5)
GFR SERPLBLD BASED ON 1.73 SQ M-ARVRAT: 75.3 ML/MIN
GLUCOSE SERPL-MCNC: 151 MG/DL (ref 70–99)
HCT VFR BLD CALC: 39.7 % (ref 36–47)
HGB BLD-MCNC: 13.4 G/DL (ref 12–15.5)
LYMPHOCYTES # BLD: 2.5 X10^3/UL (ref 1–4.8)
LYMPHOCYTES NFR BLD AUTO: 49 % (ref 24–48)
MAGNESIUM SERPL-MCNC: 2 MG/DL (ref 1.8–2.4)
MCH RBC QN AUTO: 28 PG (ref 25–35)
MCHC RBC AUTO-ENTMCNC: 34 G/DL (ref 31–37)
MCV RBC AUTO: 83 FL (ref 79–100)
METHADONE SERPL-MCNC: (no result) NG/ML
MONO #: 0.4 X10^3/UL (ref 0–1.1)
MONOCYTES NFR BLD: 7 % (ref 0–9)
NEUT #: 2.1 X10^3/UL (ref 1.8–7.7)
NEUTROPHILS NFR BLD AUTO: 41 % (ref 31–73)
OPIATES UR-MCNC: (no result) NG/ML
PCP SERPL-MCNC: (no result) MG/DL
PLATELET # BLD AUTO: 172 X10^3/UL (ref 140–400)
POTASSIUM SERPL-SCNC: 3.2 MMOL/L (ref 3.5–5.1)
PROT SERPL-MCNC: 8.8 G/DL (ref 6.4–8.2)
RBC # BLD AUTO: 4.77 X10^6/UL (ref 3.5–5.4)
SODIUM SERPL-SCNC: 140 MMOL/L (ref 136–145)
WBC # BLD AUTO: 5.1 X10^3/UL (ref 4–11)

## 2021-10-21 PROCEDURE — 80048 BASIC METABOLIC PNL TOTAL CA: CPT

## 2021-10-21 PROCEDURE — 96361 HYDRATE IV INFUSION ADD-ON: CPT

## 2021-10-21 PROCEDURE — 80307 DRUG TEST PRSMV CHEM ANLYZR: CPT

## 2021-10-21 PROCEDURE — 82550 ASSAY OF CK (CPK): CPT

## 2021-10-21 PROCEDURE — 84100 ASSAY OF PHOSPHORUS: CPT

## 2021-10-21 PROCEDURE — 93005 ELECTROCARDIOGRAM TRACING: CPT

## 2021-10-21 PROCEDURE — 85025 COMPLETE CBC W/AUTO DIFF WBC: CPT

## 2021-10-21 PROCEDURE — 36415 COLL VENOUS BLD VENIPUNCTURE: CPT

## 2021-10-21 PROCEDURE — 82962 GLUCOSE BLOOD TEST: CPT

## 2021-10-21 PROCEDURE — 80061 LIPID PANEL: CPT

## 2021-10-21 PROCEDURE — 70450 CT HEAD/BRAIN W/O DYE: CPT

## 2021-10-21 PROCEDURE — 70551 MRI BRAIN STEM W/O DYE: CPT

## 2021-10-21 PROCEDURE — 96375 TX/PRO/DX INJ NEW DRUG ADDON: CPT

## 2021-10-21 PROCEDURE — 80053 COMPREHEN METABOLIC PANEL: CPT

## 2021-10-21 PROCEDURE — G0379 DIRECT REFER HOSPITAL OBSERV: HCPCS

## 2021-10-21 PROCEDURE — 96374 THER/PROPH/DIAG INJ IV PUSH: CPT

## 2021-10-21 PROCEDURE — 93306 TTE W/DOPPLER COMPLETE: CPT

## 2021-10-21 PROCEDURE — 83735 ASSAY OF MAGNESIUM: CPT

## 2021-10-21 PROCEDURE — 71045 X-RAY EXAM CHEST 1 VIEW: CPT

## 2021-10-21 PROCEDURE — G0378 HOSPITAL OBSERVATION PER HR: HCPCS

## 2021-10-21 PROCEDURE — 84484 ASSAY OF TROPONIN QUANT: CPT

## 2021-10-21 RX ADMIN — ZOLPIDEM TARTRATE PRN MG: 5 TABLET ORAL at 20:26

## 2021-10-21 RX ADMIN — BACITRACIN SCH MLS/HR: 5000 INJECTION, POWDER, FOR SOLUTION INTRAMUSCULAR at 20:27

## 2021-10-21 SDOH — ECONOMIC STABILITY - HOUSING INSECURITY: HOMELESSNESS UNSPECIFIED: Z59.00

## 2021-10-21 NOTE — RAD
CT Head W/O Contrast:



History: Reason: dizziness not a stroke protocol: 



Comparison: March 3, 2021



Axial images were obtained without contrast.



There is a 1 cm x 0.4 similar hypoattenuating lesion in the right parasagittal frontal lobe just ante
rior to the anterior horn of the right lateral ventricle.



The remaining gray and white matter appears normal and symmetrical for the patients age.  There is no
 mass effect, extraaxial fluid collections or hydrocephalus.  There is no gross bleed.  There is no f
ocal loss of gray-white matter distinction to suggest acute ischemia, i.e. stroke.



There is a gaze to the right.





Impression:  



1. Lacunar infarct on the right appears subacute or old but was not present in March.



2. Gaze to the right.



End impression



PQRS Compliance Statement:



One or more of the following individualized dose reduction techniques were utilized for this examinat
ion:  

1. Automated exposure control  

2. Adjustment of the mA and/or kV according to patient size  

3. Use of iterative reconstruction technique



Electronically signed by: Herman Lucas III, MD (10/21/2021 1:30 PM) [facility]

## 2021-10-21 NOTE — PHYS DOC
Past Medical History


Past Medical History:  Diabetes-Type II, Hypertension, Hepatitis, Other


Additional Past Medical Histor:  OCD,ADHD, ENCEPHALITIS, PLEURISEY


Past Surgical History:  , Tubal ligation


Smoking Status:  Current Every Day Smoker


Alcohol Use:  None


Drug Use:  Marijuana, Methamphetamine





General Adult


EDM:


Chief Complaint:  DIZZY/LIGHT HEADED





HPI:


HPI:





Patient is a 51  year old female who [place] 

for report of dizziness.  She describes vertigo symptoms.  She reports that she 

has experienced the same symptoms previously.  She reports that she was feeling 

fine and then she had abrupt onset of dizziness while sitting playing slot 

machines.  She denies fall or head injury or loss of consciousness.  She denies 

numbness tingling or focal motor weakness.  She denies chest pain or dyspnea.  

She denies abdominal pain.  She reports mild nausea, without vomiting.  She is 

an extremely poor historian and does not seem to be completely willing to 

participate in her care or provide more detailed history.  She admits to using 

methamphetamine yesterday.  She has a history of recently diagnosed type 2 

diabetes as well as hypertension.  She admits that she has not taken any of her 

prescribed medications "in a while."





Review of Systems:


Review of Systems:


Review of systems is largely not fully obtainable secondary to patient's refusal

to fully cooperate and answer most questions





Constitutional:   She denies fever.


Eyes:   She admits to some photophobia.  Denies vision loss.


Respiratory:   Denies shortness of breath


Cardiovascular:   She denies chest pain.


GI:   Denies abdominal pain.  Admits to nausea, no reported vomiting.


Musculoskeletal: She denies any joint pain or swelling.


Integument:   Multiple scabs and pick marks on her skin.


Neurologic:   Reports vertigo symptoms and dizziness.  Denies numbness, focal 

weakness, paralysis, syncope.


Endocrine:   Has chronically uncontrolled and untreated type 2 diabetes 

mellitus.


Psychiatric: Longstanding use of methamphetamine and marijuana.





Heart Score:


C/O Chest Pain:  No


Risk Factors:


Risk Factors:  DM, Current or recent (<one month) smoker, HTN, HLP, family 

history of CAD, obesity.


Risk Scores:


Score 0 - 3:  2.5% MACE over next 6 weeks - Discharge Home


Score 4 - 6:  20.3% MACE over next 6 weeks - Admit for Clinical Observation


Score 7 - 10:  72.7% MACE over next 6 weeks - Early Invasive Strategies





Allergies:


Allergies:





Allergies








Coded Allergies Type Severity Reaction Last Updated Verified


 


  No Known Drug Allergies    17 No











Physical Exam:


PE:





Constitutional: She is disheveled appearing, appears older than stated age, she 

is well-developed and well-nourished.  She is mildly ill-appearing.


HENT: Normocephalic, atraumatic, bilateral external ears normal, TMs are clear 

bilaterally.  Mucous membranes are moist.


Eyes: PERRL, conjunctiva normal, no discharge.  She refuses to fully cooperate 

with very detailed extraocular movement testing but grossly extraocular muscles 

appear to be intact.  There does appear to be bilateral horizontal nystagmus 

noted.  Sclera are clear and anicteric


Neck: Normal range of motion, no tenderness, supple, no stridor.  No 

meningismus.


Cardiovascular:Heart rate regular rhythm, +2 radial and dorsalis pedis pulses 

bilaterally


Lungs & Thorax:  Bilateral breath sounds clear to auscultation, equal chest 

rise, diminished minimally in the bilateral bases.  No distress.


Abdomen: Abdomen is obese, soft, nondistended, no tenderness to palpation.


Skin: There is diaphoresis noted of her face, scalp and chest and abdomen.  She 

has multiple scabbed lesions on her face, bilateral upper and lower extremities,

which appear to be scratch/pick marks.  No focal areas of erythema, fluctuance, 

drainage, or tenderness.


Back: No tenderness, no deformity.


Extremities: No tenderness, no cyanosis, no clubbing, ROM intact, no edema.  No 

limb deformity.  No calf tenderness.


Neurologic: She is resting on the ED gurney with her eyes closed, but she 

responds to voice.  She opens her eyes when commanded to do so.  Therefore, I 

would qualify her as slightly drowsy.  She is oriented x3.  Cranial nerves II 

through XII are grossly intact.  Sensation is grossly intact.  She is moving all

4 extremities equally.  Equal  strength bilaterally.  Refuses to cooperate 

with testing for limb ataxia or dysmetria.  No evidence of aphasia.  The minimal

speech that she will elicit for me appears to be fluent.


Psychologic: Bizarre, flat affect.





EKG:


EKG:


EKG is interpreted at 1236


Rhythm is sinus


Rate is 69 bpm


Axis is normal


QTC is 475 ms


No STEMI





Radiology/Procedures:


Radiology/Procedures:


                                 IMAGING REPORT





                                     Signed





PATIENT: PIPO HAQ    ACCOUNT: [id number]     MRN#: [id number]


: 1970           LOCATION: ER              AGE: 51


SEX: F                    EXAM DT: 10/21/21         ACCESSION#: [id number]


STATUS: REG ER            ORD. PHYSICIAN: PEDRO PABLO VALENTINO DO


REASON: dizziness


PROCEDURE: CT HEAD WO CONTRAST





CT Head W/O Contrast:





History: Reason: dizziness not a stroke protocol: 





Comparison: March 3, 2021





Axial images were obtained without contrast.





There is a 1 cm x 0.4 similar hypoattenuating lesion in the right parasagittal 

frontal lobe just anterior to the anterior horn of the right lateral ventricle.





The remaining gray and white matter appears normal and symmetrical for the 

patients age.  There is no mass effect, extraaxial fluid collections or 

hydrocephalus.  There is no gross bleed.  There is no focal loss of gray-white 

matter distinction to suggest acute ischemia, i.e. stroke.





There is a gaze to the right.








Impression:  





1. Lacunar infarct on the right appears subacute or old but was not present in 

March.





2. Gaze to the right.





End impression





PQRS Compliance Statement:





One or more of the following individualized dose reduction techniques were 

utilized for this examination:  


1. Automated exposure control  


2. Adjustment of the mA and/or kV according to patient size  


3. Use of iterative reconstruction technique





Electronically signed by: Umesh Gr III, MD (10/21/2021 1:30 PM) [facility]














DICTATED and SIGNED BY:     UMESH GR III, MD


DATE:     10/21/21 [id number] 0








                                 IMAGING REPORT





                                     Signed





PATIENT: PIPO HAQ    ACCOUNT: [id number]     MRN#: [id number]


: 1970           LOCATION: ER              AGE: 51


SEX: F                    EXAM DT: 10/21/21         ACCESSION#: [id number]


STATUS: PRE ER            ORD. PHYSICIAN: PEDRO PABLO VALENTINO DO


REASON: dizziness


PROCEDURE: PORTABLE CHEST 1V





XR CHEST 1V





History: Reason: dizziness / Spl. Instructions:  / History: 





Comparison: 2021





Findings:


Mild patchy bibasilar opacities. No pleural effusion. No pneumothorax. Unchanged

heart size. Low lung volumes.





Impression: 


1.  Low lung volumes with mild patchy bibasilar opacities, most likely 

atelectasis. If persistent clinical concern, recommend follow-up.





Electronically signed by: Chacorta Avila DO (10/21/2021 1:03 PM) [id number]














DICTATED and SIGNED BY:     CHACORTA AVILA DO


DATE:     10/21/21 [id number] 0





Course & Med Decision Making:


Course & Med Decision Making


Pertinent Labs and Imaging studies reviewed. (See chart for details)








The patient was largely uncooperative throughout the ED course. I went to 

examine her multiple times, and she was wildly thrashing about the ED gurney, 

refusing to sit up or cooperate with exam. I asked her multiple times to please 

allow me to formally examine her more thoroughly. She finally did quiets this 

request, while frequently cursing at me. She does have some persistent bilateral

horizontal nystagmus. She did begin to vomit. She has an otherwise nonfocal 

neurologic exam. She is given IV fluids, IV Zofran, IV Benadryl. Her blood 

pressure did increase, and then she began complaining of a headache. She is 

given Toradol and IV labetalol. Her blood pressure and improved, and her 

headache improved and her nausea and vomiting resolved. She is unable to 

ambulate successfully, so I explained the plan for admission and neurology 

consultation. I did discuss the findings of subacute infarct on her CT, and she 

was unaware of this. She is finally agreeable to admission. She is accepted for 

admission by Dr. Summers.





Shannan Disclaimer:


Shannan Disclaimer:


This electronic medical record was generated, in whole or in part, using a voice

recognition dictation system.





Departure


Departure


Impression:  


   Primary Impression:  


   Vertigo


   Additional Impressions:  


   Nausea & vomiting


   History of CVA (cerebrovascular accident)


   Uncontrolled hypertension


   Methamphetamine abuse


   Non-compliance


Disposition:   ADMITTED AS INPATIENT


Admitting Physician:  HIMS


Condition:  IMPROVED


Referrals:  


NO PCP (PCP)











PEDRO PABLO VALENTINO DO             Oct 21, 2021 12:45

## 2021-10-21 NOTE — RAD
XR CHEST 1V



History: Reason: dizziness / Spl. Instructions:  / History: 



Comparison: February 28, 2021



Findings:

Mild patchy bibasilar opacities. No pleural effusion. No pneumothorax. Unchanged heart size. Low lung
 volumes.



Impression: 

1.  Low lung volumes with mild patchy bibasilar opacities, most likely atelectasis. If persistent cli
nical concern, recommend follow-up.



Electronically signed by: Chacorta Avila DO (10/21/2021 1:03 PM) [id number]

## 2021-10-21 NOTE — PDOC1
History and Physical


Date of Service:


DOS:


DATE: 10/21/21 


TIME: 18:29





Chief Complaint:


Chief Complain:


Dizziness





History of Present Illness:


HPI:


Hx obtained from discussion with the ED physician and chart review:





51  year old female who [place] of 

dizziness.  She describes vertigo symptoms.  She reports that she has 

experienced the same symptoms previously.  She reports that she was feeling fine

and then she had abrupt onset of dizziness while sitting playing slot machines. 

She denies fall or head injury or loss of consciousness.  She denies numbness 

tingling or focal motor weakness.  She denies chest pain or dyspnea.  She denies

abdominal pain.  She reports mild nausea, without vomiting.  She is an extremely

poor historian and does not seem to be completely willing to participate in her 

care or provide more detailed history.  She admits to using methamphetamine 

yesterday.  She has a history of recently diagnosed type 2 diabetes as well as 

hypertension.  She admits that she has not taken any of her prescribed 

medications "in a while."





Past Medical/Surgical History:


PMH/PSH:


Past Medical History:  Diabetes-Type II, Hypertension, Hepatitis, OCD,ADHD, 

ENCEPHALITIS, PLEURISEY


Past Surgical History:  , Tubal ligation





Allergies:


Allergies:  


Coded Allergies:  


     No Known Drug Allergies (Unverified , 17)





Family History:


Family History:


Reviewed with no relevant findings





Social History:


Social History:


Smoking Status:  Current Every Day Smoker


Alcohol Use:  None


Drug Use:  Marijuana, Methamphetamine





Current Medications:


Current Medications





Current Medications


Sodium Chloride 1,000 ml @  1,000 mls/hr 1X  ONCE IV  Last administered on 

10/21/21at 12:55;  Start 10/21/21 at 12:45;  Stop 10/21/21 at 13:44;  Status DC


Ondansetron HCl (Zofran) 4 mg 1X  ONCE IVP  Last administered on 10/21/21at 

16:11;  Start 10/21/21 at 16:00;  Stop 10/21/21 at 16:01;  Status DC


Ketorolac Tromethamine (Toradol 15mg Vial) 15 mg 1X  ONCE IVP  Last administered

on 10/21/21at 16:52;  Start 10/21/21 at 16:15;  Stop 10/21/21 at 16:20;  Status 

DC


Diphenhydramine HCl (Benadryl) 25 mg 1X  ONCE IVP  Last administered on 

10/21/21at 16:52;  Start 10/21/21 at 16:15;  Stop 10/21/21 at 16:20;  Status DC


Labetalol HCl (Normodyne Iv Push) 10 mg 1X  ONCE IVP  Last administered on 

10/21/21at 16:53;  Start 10/21/21 at 16:15;  Stop 10/21/21 at 16:20;  Status DC


Ondansetron HCl (Zofran) 4 mg PRN Q8HRS  PRN IVP NAUSEA/VOMITING;  Start 

10/21/21 at 16:30;  Stop 10/22/21 at 16:29


Sodium Chloride 1,000 ml @  125 mls/hr 1X  ONCE IV  Last administered on 

10/21/21at 16:55;  Start 10/21/21 at 16:30;  Stop 10/22/21 at 00:29


Aspirin (Ecotrin) 325 mg 1X  ONCE PO ;  Start 10/21/21 at 17:00;  Stop 10/21/21 

at 17:01;  Status DC


Labetalol HCl (Normodyne Iv Push) 10 mg 1X  ONCE IVP ;  Start 10/21/21 at 17:30;

 Stop 10/21/21 at 17:31;  Status DC


Sennosides (Senna) 17.2 mg PRN BID  PRN PO CONSTIPATION;  Start 10/21/21 at 

18:30;  Status UNV


Docusate Sodium (Colace) 100 mg PRN DAILY  PRN PO HARD STOOLS;  Start 10/21/21 

at 18:30;  Status UNV


Ondansetron HCl (Zofran) 4 mg PRN Q6HRS  PRN IVP NAUSEA/VOMITING;  Start 

10/21/21 at 18:30;  Status UNV


Dextrose (Dextrose 50%-Water Syringe) 12.5 gm PRN Q15MIN  PRN IV SEE COMMENTS;  

Start 10/21/21 at 18:30;  Status UNV


Sodium Chloride 1,000 ml @  100 mls/hr Q10H IV ;  Start 10/21/21 at 18:30;  

Status UNV


Acetaminophen (Tylenol) 650 mg PRN Q4HRS  PRN PO TEMP OVER 100.4F OR MILD PAIN; 

Start 10/21/21 at 18:30;  Status UNV


Lorazepam (Ativan) 0.5 mg PRN Q6HRS  PRN PO ANXIETY / AGITATION;  Start 10/21/21

at 18:30;  Status UNV


Lorazepam (Ativan Inj) 0.25 mg PRN Q4HRS  PRN IV ANXIETY / AGITATION;  Start 

10/21/21 at 18:30;  Status UNV


Enoxaparin Sodium (Lovenox 40mg Syringe) 40 mg Q24H SQ ;  Start 10/21/21 at 

18:30;  Status UNV


Prochlorperazine Edisylate (Compazine) 10 mg PRN Q6HRS  PRN IV NAUSEA/VOMITING; 

Start 10/21/21 at 18:30;  Status UNV


Zolpidem Tartrate (Ambien) 2.5 mg PRN QHS  PRN PO INSOMNIA;  Start 10/21/21 at 

18:30;  Status UNV





Active Scripts


Active


Lisinopril 5 Mg Tablet 1 Tab PO DAILY


Losirt-Gadcovus-Wbly -40 (Butalb/Acetaminophen/Caffeine) 1 Each Tablet 1 

Tab PO PRN Q6HRS PRN


Doxycycline Hyclate 100 Mg Tablet 1 Tab PO BID


Glucophage (Metformin Hcl) 850 Mg Tablet 850 Mg PO BIDWMEALS


Amlodipine Besylate 5 Mg Tablet 5 Mg PO DAILY


Metoprolol Tartrate 25 Mg Tablet 25 Mg PO BID


Reported


No Known Medications Prior To Admisstion (Info)  Each 1 Each  UNK





ROS:


Review of Systems


Review of System


REVIEW OF SYSTEMS:


GENERAL:  Denies weakness


SKIN:  No bruising, hair changes or rashes.


EYES:  No blurred, double or loss of vision.


NOSE AND THROAT:  No history of nosebleeds, hoarseness or sore throat.


HEART:  No history of palpitations, chest pain or shortness of breath on


exertion.


LUNGS:  Denies cough, hemoptysis, wheezing or shortness of breath.


GASTROINTESTINAL:  Denies changes in appetite, nausea, vomiting, diarrhea or


constipation.


GENITOURINARY:  No history of frequency, urgency, hesitancy or nocturia.


NEUROLOGIC:  Denies history of numbness, tingling, or tremor.


PSYCHIATRIC:  No history of panic, anxiety or depression.


ENDOCRINE:  No history of heat or cold intolerance, polyuria or polydipsia.


EXTREMITIES:  Denies joint pain, pain on walking or stiffness.





Physical Exam:


Vital Signs:





Vital Signs








  Date Time  Temp Pulse Resp B/P (MAP) Pulse Ox O2 Delivery O2 Flow Rate FiO2


 


10/21/21 17:29  75  173/74 (107) 97 Room Air  


 


10/21/21 12:30 98.6  20     





 98.6       








Physcial Exam:


GEN:   No apparent distress.  Alert and oriented


HEENT:   Normal cephalic, atraumatic, external auditory canals are patent


EYES:   Extraocular muscles are intact, pupil are equally round and reactive to 

light and accommodation


MUSCULOSKELETAL:  Well developed , well nourished, good range of motion


ENDOCRINE:   No thyromegaly was palpated


LYMPHATICS:   No cervical chain or axillary nodes were noted


HEMATOPOIETIC:  No bruising


NECK:   Supple, no JVD, no thyromegaly was noted


LUNGS:   Clear to auscultation in all lung fields without rhonchi or wheezing


HEART:    RRR, S!, S2 present.  Peripheral pulses intact, no obvious murmurs 

noted


ABDOMEN:   Soft, nontender.  Positive bowel sounds, no organomegaly, normal 

bowel sounds


EXTREMITIES:   Without clubbing, cyanosis, or edema.  Pedal pulses intact.  

Negative Homans sign


NEUROLOGIC:   Normal speech and tone.  A&O x 3, moves all extremities, no 

obvious focal deficits


PSYCHIATRIC:   Normal affect, normal mood. Stable


SKIN:   No ulcerations or rashes, good skin turgor, no jaundice


VASCULAR:   Good capillary refill, neurovascular bundle appears to be intact





Labs:


Labs:





Laboratory Tests








Test


 10/21/21


12:32 10/21/21


12:38 10/21/21


12:45 10/21/21


14:10


 


Glucose (Fingerstick)


 156 mg/dL


(70-99) 


 


 





 


White Blood Count


 


 5.1 x10^3/uL


(4.0-11.0) 


 





 


Red Blood Count


 


 4.77 x10^6/uL


(3.50-5.40) 


 





 


Hemoglobin


 


 13.4 g/dL


(12.0-15.5) 


 





 


Hematocrit


 


 39.7 %


(36.0-47.0) 


 





 


Mean Corpuscular Volume  83 fL ()   


 


Mean Corpuscular Hemoglobin  28 pg (25-35)   


 


Mean Corpuscular Hemoglobin


Concent 


 34 g/dL


(31-37) 


 





 


Red Cell Distribution Width


 


 17.1 %


(11.5-14.5) 


 





 


Platelet Count


 


 172 x10^3/uL


(140-400) 


 





 


Neutrophils (%) (Auto)  41 % (31-73)   


 


Lymphocytes (%) (Auto)  49 % (24-48)   


 


Monocytes (%) (Auto)  7 % (0-9)   


 


Eosinophils (%) (Auto)  2 % (0-3)   


 


Basophils (%) (Auto)  1 % (0-3)   


 


Neutrophils # (Auto)


 


 2.1 x10^3/uL


(1.8-7.7) 


 





 


Lymphocytes # (Auto)


 


 2.5 x10^3/uL


(1.0-4.8) 


 





 


Monocytes # (Auto)


 


 0.4 x10^3/uL


(0.0-1.1) 


 





 


Eosinophils # (Auto)


 


 0.1 x10^3/uL


(0.0-0.7) 


 





 


Basophils # (Auto)


 


 0.0 x10^3/uL


(0.0-0.2) 


 





 


Sodium Level


 


 


 140 mmol/L


(136-145) 





 


Potassium Level


 


 


 3.2 mmol/L


(3.5-5.1) 





 


Chloride Level


 


 


 102 mmol/L


() 





 


Carbon Dioxide Level


 


 


 27 mmol/L


(21-32) 





 


Anion Gap   11 (6-14)  


 


Blood Urea Nitrogen


 


 


 11 mg/dL


(7-20) 





 


Creatinine


 


 


 0.8 mg/dL


(0.6-1.0) 





 


Estimated GFR


(Cockcroft-Gault) 


 


 75.3 


 





 


BUN/Creatinine Ratio   14 (6-20)  


 


Glucose Level


 


 


 151 mg/dL


(70-99) 





 


Calcium Level


 


 


 9.1 mg/dL


(8.5-10.1) 





 


Magnesium Level


 


 


 2.0 mg/dL


(1.8-2.4) 





 


Total Bilirubin


 


 


 0.7 mg/dL


(0.2-1.0) 





 


Aspartate Amino Transf


(AST/SGOT) 


 


 136 U/L


(15-37) 





 


Alanine Aminotransferase


(ALT/SGPT) 


 


 198 U/L


(14-59) 





 


Alkaline Phosphatase


 


 


 127 U/L


() 





 


Creatine Kinase


 


 


 84 U/L


() 





 


Troponin I Quantitative


 


 


 < 0.017 ng/mL


(0.000-0.055) 





 


Total Protein


 


 


 8.8 g/dL


(6.4-8.2) 





 


Albumin


 


 


 3.7 g/dL


(3.4-5.0) 





 


Albumin/Globulin Ratio   0.7 (1.0-1.7)  


 


Ethyl Alcohol Level


 


 


 < 10 mg/dL


(0-10) 





 


Urine Opiates Screen    Neg (NEG) 


 


Urine Methadone Screen    Neg (NEG) 


 


Urine Barbiturates    Neg (NEG) 


 


Urine Phencyclidine Screen    Neg (NEG) 


 


Urine


Amphetamine/Methamphetamine 


 


 


 Pos (NEG) 





 


Urine Benzodiazepines Screen    Neg (NEG) 


 


Urine Cocaine Screen    Neg (NEG) 


 


Urine Cannabinoids Screen    Neg (NEG) 


 


Urine Ethyl Alcohol    Neg (NEG) 








Laboratory Tests








Test


 10/21/21


12:32 10/21/21


12:38 10/21/21


12:45 10/21/21


14:10


 


Glucose (Fingerstick)


 156 mg/dL


(70-99) 


 


 





 


White Blood Count


 


 5.1 x10^3/uL


(4.0-11.0) 


 





 


Red Blood Count


 


 4.77 x10^6/uL


(3.50-5.40) 


 





 


Hemoglobin


 


 13.4 g/dL


(12.0-15.5) 


 





 


Hematocrit


 


 39.7 %


(36.0-47.0) 


 





 


Mean Corpuscular Volume  83 fL ()   


 


Mean Corpuscular Hemoglobin  28 pg (25-35)   


 


Mean Corpuscular Hemoglobin


Concent 


 34 g/dL


(31-37) 


 





 


Red Cell Distribution Width


 


 17.1 %


(11.5-14.5) 


 





 


Platelet Count


 


 172 x10^3/uL


(140-400) 


 





 


Neutrophils (%) (Auto)  41 % (31-73)   


 


Lymphocytes (%) (Auto)  49 % (24-48)   


 


Monocytes (%) (Auto)  7 % (0-9)   


 


Eosinophils (%) (Auto)  2 % (0-3)   


 


Basophils (%) (Auto)  1 % (0-3)   


 


Neutrophils # (Auto)


 


 2.1 x10^3/uL


(1.8-7.7) 


 





 


Lymphocytes # (Auto)


 


 2.5 x10^3/uL


(1.0-4.8) 


 





 


Monocytes # (Auto)


 


 0.4 x10^3/uL


(0.0-1.1) 


 





 


Eosinophils # (Auto)


 


 0.1 x10^3/uL


(0.0-0.7) 


 





 


Basophils # (Auto)


 


 0.0 x10^3/uL


(0.0-0.2) 


 





 


Sodium Level


 


 


 140 mmol/L


(136-145) 





 


Potassium Level


 


 


 3.2 mmol/L


(3.5-5.1) 





 


Chloride Level


 


 


 102 mmol/L


() 





 


Carbon Dioxide Level


 


 


 27 mmol/L


(21-32) 





 


Anion Gap   11 (6-14)  


 


Blood Urea Nitrogen


 


 


 11 mg/dL


(7-20) 





 


Creatinine


 


 


 0.8 mg/dL


(0.6-1.0) 





 


Estimated GFR


(Cockcroft-Gault) 


 


 75.3 


 





 


BUN/Creatinine Ratio   14 (6-20)  


 


Glucose Level


 


 


 151 mg/dL


(70-99) 





 


Calcium Level


 


 


 9.1 mg/dL


(8.5-10.1) 





 


Magnesium Level


 


 


 2.0 mg/dL


(1.8-2.4) 





 


Total Bilirubin


 


 


 0.7 mg/dL


(0.2-1.0) 





 


Aspartate Amino Transf


(AST/SGOT) 


 


 136 U/L


(15-37) 





 


Alanine Aminotransferase


(ALT/SGPT) 


 


 198 U/L


(14-59) 





 


Alkaline Phosphatase


 


 


 127 U/L


() 





 


Creatine Kinase


 


 


 84 U/L


() 





 


Troponin I Quantitative


 


 


 < 0.017 ng/mL


(0.000-0.055) 





 


Total Protein


 


 


 8.8 g/dL


(6.4-8.2) 





 


Albumin


 


 


 3.7 g/dL


(3.4-5.0) 





 


Albumin/Globulin Ratio   0.7 (1.0-1.7)  


 


Ethyl Alcohol Level


 


 


 < 10 mg/dL


(0-10) 





 


Urine Opiates Screen    Neg (NEG) 


 


Urine Methadone Screen    Neg (NEG) 


 


Urine Barbiturates    Neg (NEG) 


 


Urine Phencyclidine Screen    Neg (NEG) 


 


Urine


Amphetamine/Methamphetamine 


 


 


 Pos (NEG) 





 


Urine Benzodiazepines Screen    Neg (NEG) 


 


Urine Cocaine Screen    Neg (NEG) 


 


Urine Cannabinoids Screen    Neg (NEG) 


 


Urine Ethyl Alcohol    Neg (NEG) 











Images:


Images


PROCEDURE: CT HEAD WO CONTRAST





CT Head W/O Contrast:





History: Reason: dizziness not a stroke protocol: 





Comparison: March 3, 2021





Axial images were obtained without contrast.





There is a 1 cm x 0.4 similar hypoattenuating lesion in the right parasagittal 

frontal lobe just anterior to the anterior horn of the right lateral ventricle.





The remaining gray and white matter appears normal and symmetrical for the 

patients age.  There is no mass effect, extraaxial fluid collections or 

hydrocephalus.  There is no gross bleed.  There is no focal loss of gray-white 

matter distinction to suggest acute ischemia, i.e. stroke.





There is a gaze to the right.








Impression:  





1. Lacunar infarct on the right appears subacute or old but was not present in 

March.





2. Gaze to the right.





Assessment/Plan


Assessment/Plan


Acute toxic and metabolic encephalopathy


Mild Hypokalemia


Peripheral vertigo


Subacute to chronic thalamic infarct


Inability to ambulate


Debilitation


Methamphetamine abuse


Transaminitis





Admit to hospitalist service for further management


Ativan as needed for agitative episodes


No IV electrolytes replacement as needed


Neurology consult


Orthostatic vital signs


PT OT


Lovenox for DVT prophylaxis


Cardiac diet


CODE STATUS full


Discussed with RN and SW


Disposition inpatient management as above, patient is homeless


DPOA: Undesignated





Justifications for Admission


Other Justification


Vertigo and Acute toxic encephalopathy











JANIS GONGORA MD                  Oct 21, 2021 18:33

## 2021-10-22 VITALS — DIASTOLIC BLOOD PRESSURE: 92 MMHG | SYSTOLIC BLOOD PRESSURE: 170 MMHG

## 2021-10-22 VITALS — DIASTOLIC BLOOD PRESSURE: 102 MMHG | SYSTOLIC BLOOD PRESSURE: 151 MMHG

## 2021-10-22 VITALS — DIASTOLIC BLOOD PRESSURE: 67 MMHG | SYSTOLIC BLOOD PRESSURE: 157 MMHG

## 2021-10-22 VITALS — DIASTOLIC BLOOD PRESSURE: 98 MMHG | SYSTOLIC BLOOD PRESSURE: 179 MMHG

## 2021-10-22 VITALS — DIASTOLIC BLOOD PRESSURE: 86 MMHG | SYSTOLIC BLOOD PRESSURE: 183 MMHG

## 2021-10-22 VITALS — DIASTOLIC BLOOD PRESSURE: 88 MMHG | SYSTOLIC BLOOD PRESSURE: 184 MMHG

## 2021-10-22 LAB
ANION GAP SERPL CALC-SCNC: 9 MMOL/L (ref 6–14)
BASOPHILS # BLD AUTO: 0 X10^3/UL (ref 0–0.2)
BASOPHILS NFR BLD: 0 % (ref 0–3)
BUN SERPL-MCNC: 8 MG/DL (ref 7–20)
CALCIUM SERPL-MCNC: 8 MG/DL (ref 8.5–10.1)
CHLORIDE SERPL-SCNC: 105 MMOL/L (ref 98–107)
CO2 SERPL-SCNC: 26 MMOL/L (ref 21–32)
CREAT SERPL-MCNC: 0.7 MG/DL (ref 0.6–1)
EOSINOPHIL NFR BLD: 0.1 X10^3/UL (ref 0–0.7)
EOSINOPHIL NFR BLD: 1 % (ref 0–3)
ERYTHROCYTE [DISTWIDTH] IN BLOOD BY AUTOMATED COUNT: 16.9 % (ref 11.5–14.5)
GFR SERPLBLD BASED ON 1.73 SQ M-ARVRAT: 87.9 ML/MIN
GLUCOSE SERPL-MCNC: 109 MG/DL (ref 70–99)
HCT VFR BLD CALC: 38.7 % (ref 36–47)
HGB BLD-MCNC: 12.7 G/DL (ref 12–15.5)
LYMPHOCYTES # BLD: 2 X10^3/UL (ref 1–4.8)
LYMPHOCYTES NFR BLD AUTO: 46 % (ref 24–48)
MAGNESIUM SERPL-MCNC: 2 MG/DL (ref 1.8–2.4)
MCH RBC QN AUTO: 28 PG (ref 25–35)
MCHC RBC AUTO-ENTMCNC: 33 G/DL (ref 31–37)
MCV RBC AUTO: 84 FL (ref 79–100)
MONO #: 0.3 X10^3/UL (ref 0–1.1)
MONOCYTES NFR BLD: 7 % (ref 0–9)
NEUT #: 2 X10^3/UL (ref 1.8–7.7)
NEUTROPHILS NFR BLD AUTO: 45 % (ref 31–73)
PHOSPHATE SERPL-MCNC: 3.8 MG/DL (ref 2.6–4.7)
PLATELET # BLD AUTO: 143 X10^3/UL (ref 140–400)
POTASSIUM SERPL-SCNC: 3.5 MMOL/L (ref 3.5–5.1)
RBC # BLD AUTO: 4.6 X10^6/UL (ref 3.5–5.4)
SODIUM SERPL-SCNC: 140 MMOL/L (ref 136–145)
WBC # BLD AUTO: 4.4 X10^3/UL (ref 4–11)

## 2021-10-22 RX ADMIN — ZOLPIDEM TARTRATE PRN MG: 5 TABLET ORAL at 21:55

## 2021-10-22 RX ADMIN — ASPIRIN SCH MG: 81 TABLET, COATED ORAL at 09:12

## 2021-10-22 RX ADMIN — ENOXAPARIN SODIUM SCH MG: 40 INJECTION SUBCUTANEOUS at 09:13

## 2021-10-22 RX ADMIN — BACITRACIN SCH MLS/HR: 5000 INJECTION, POWDER, FOR SOLUTION INTRAMUSCULAR at 04:30

## 2021-10-22 RX ADMIN — METOPROLOL TARTRATE SCH MG: 25 TABLET ORAL at 21:56

## 2021-10-22 RX ADMIN — BACITRACIN SCH MLS/HR: 5000 INJECTION, POWDER, FOR SOLUTION INTRAMUSCULAR at 15:03

## 2021-10-22 NOTE — PDOC2
NEUROLOGY CONSULT


Date of Service


DOS:


DATE: 10/22/21 


TIME: 08:44





Reason for Consult


Reason for Consult:


Subacute lacunar infarct, vertigo





Referring Physician


Referring Physician:


Dr. Summers





Source


Source:  Chart review, Patient





History of Present Illness


History of Present Illness


The patient is a 51-year-old right-handed female who was [place] a casino and 

complained of sudden onset of vertigo.  She had this same problem in April and 

was in the emergency department for benign positional vertigo.  CT head was 

negative.  Indeed, vertigo is worse when she tilts her head to the left.  There 

is no tinnitus, hearing loss, dysarthria, dysphagia, diplopia, numbness, or 

weakness.  She had a head CT last night which is abnormal as reviewed below.  

She denies any history of stroke, seizure, or head injury.  Patient says that 

she is basically homeless, living in some friend's apartment who are infected 

from it, waiting to be evicted herself, but she was locked out of the apartment 

building and unable to get to her medications for the last several days.  She 

sees a clinic doctor at  who prescribed medication for diabetes and 

hypertension.  Blood pressure was elevated in the emergency department.





Past Medical History


Cardiovascular:  HTN


CENTRAL NERVOUS SYSTEM:  Vertigo


Hepatobiliary:  Hep A/B/C (C)


Psych:  Anxiety, Addictions, Depression


Endocrine:  Diabetes





Past Surgical History


Past Surgical History:  No pertinent history





Family History


Family History:  Other (Mother had myasthenia gravis)





Social History


Social History


Single, unemployed, uses tobacco, occasional alcohol, occasional methamphetamine





Current Medications


Current Medications





Current Medications


Sodium Chloride 1,000 ml @  1,000 mls/hr 1X  ONCE IV  Last administered on 

10/21/21at 12:55;  Start 10/21/21 at 12:45;  Stop 10/21/21 at 13:44;  Status DC


Ondansetron HCl (Zofran) 4 mg 1X  ONCE IVP  Last administered on 10/21/21at 

16:11;  Start 10/21/21 at 16:00;  Stop 10/21/21 at 16:01;  Status DC


Ketorolac Tromethamine (Toradol 15mg Vial) 15 mg 1X  ONCE IVP  Last administered

on 10/21/21at 16:52;  Start 10/21/21 at 16:15;  Stop 10/21/21 at 16:20;  Status 

DC


Diphenhydramine HCl (Benadryl) 25 mg 1X  ONCE IVP  Last administered on 

10/21/21at 16:52;  Start 10/21/21 at 16:15;  Stop 10/21/21 at 16:20;  Status DC


Labetalol HCl (Normodyne Iv Push) 10 mg 1X  ONCE IVP  Last administered on 10/21

/21at 16:53;  Start 10/21/21 at 16:15;  Stop 10/21/21 at 16:20;  Status DC


Ondansetron HCl (Zofran) 4 mg PRN Q8HRS  PRN IVP NAUSEA/VOMITING;  Start 

10/21/21 at 16:30;  Stop 10/22/21 at 16:29


Sodium Chloride 1,000 ml @  125 mls/hr 1X  ONCE IV  Last administered on 

10/21/21at 16:55;  Start 10/21/21 at 16:30;  Stop 10/22/21 at 00:29;  Status DC


Aspirin (Ecotrin) 325 mg 1X  ONCE PO  Last administered on 10/21/21at 20:27;  

Start 10/21/21 at 17:00;  Stop 10/21/21 at 17:01;  Status DC


Labetalol HCl (Normodyne Iv Push) 10 mg 1X  ONCE IVP ;  Start 10/21/21 at 17:30;

 Stop 10/21/21 at 17:31;  Status DC


Sennosides (Senna) 17.2 mg PRN BID  PRN PO CONSTIPATION;  Start 10/21/21 at 

18:30


Docusate Sodium (Colace) 100 mg PRN DAILY  PRN PO HARD STOOLS;  Start 10/21/21 

at 18:30


Ondansetron HCl (Zofran) 4 mg PRN Q6HRS  PRN IVP NAUSEA/VOMITING;  Start 

10/21/21 at 18:30


Dextrose (Dextrose 50%-Water Syringe) 12.5 gm PRN Q15MIN  PRN IV SEE COMMENTS;  

Start 10/21/21 at 18:30


Sodium Chloride 1,000 ml @  100 mls/hr Q10H IV  Last administered on 10/22/21at 

04:30;  Start 10/21/21 at 18:30


Acetaminophen (Tylenol) 650 mg PRN Q4HRS  PRN PO TEMP OVER 100.4F OR MILD PAIN; 

Start 10/21/21 at 18:30


Lorazepam (Ativan) 0.5 mg PRN Q6HRS  PRN PO ANXIETY / AGITATION Last 

administered on 10/21/21at 20:27;  Start 10/21/21 at 18:30


Lorazepam (Ativan Inj) 0.25 mg PRN Q4HRS  PRN IV ANXIETY / AGITATION;  Start 

10/21/21 at 18:30


Enoxaparin Sodium (Lovenox 40mg Syringe) 40 mg Q24H SQ ;  Start 10/22/21 at 

09:00


Prochlorperazine Edisylate (Compazine) 10 mg PRN Q6HRS  PRN IV NAUSEA/VOMITING; 

Start 10/21/21 at 18:30


Zolpidem Tartrate (Ambien) 2.5 mg PRN QHS  PRN PO INSOMNIA Last administered on 

10/21/21at 20:26;  Start 10/21/21 at 18:30


Hydralazine HCl (Apresoline Inj) 10 mg PRN Q4HRS  PRN IVP ELEV BP,SEE COMMENT

,2ND CHOICE;  Start 10/21/21 at 18:30


Labetalol HCl (Normodyne Iv Push) 20 mg PRN Q2HRS  PRN IVP HYPERTENSION, 1ST 

CHOICE;  Start 10/21/21 at 18:30





Active Scripts


Active


Lisinopril 5 Mg Tablet 1 Tab PO DAILY


[id number]-Dpmb -40 (Butalb/Acetaminophen/Caffeine) 1 Each Tablet 1 

Tab PO PRN Q6HRS PRN


Doxycycline Hyclate 100 Mg Tablet 1 Tab PO BID


Glucophage (Metformin Hcl) 850 Mg Tablet 850 Mg PO BIDWMEALS


Amlodipine Besylate 5 Mg Tablet 5 Mg PO DAILY


Metoprolol Tartrate 25 Mg Tablet 25 Mg PO BID


Reported


No Known Medications Prior To Admisstion (Info)  Each 1 Each  UNK





Allergies


Allergies:  


Coded Allergies:  


     No Known Drug Allergies (Unverified , 11/27/17)





ROS


Review of System


Negative for fever, chills, weight loss, shortness of breath, chest pain, 

indigestion, hematochezia, melena, and dysuria.  Full 14-point review of systems

is negative.





Physical Exam


Physical Examination


General:


Well-developed, well-nourished, white female, in no acute distress


HEENT:


Normocephalic andatraumatic. Tympanic membranes clear.Temporal 

arteriespulsatile and nontender.


Neck:


Supple without bruit, no meningismus


Musculoskeletal:


Stability:see neurologic. Gait exam:see neurologic. Tone:see 

neurologic.Strength:see neurologic.


Neurological:


Mental Status:intact, orientation, memory, attention span/concentration, 

language, fund of knowledge normal. Cranial Nerves:Pupils equal and reactive to

light, extraocular movements areintact, visual fields are full to 

confrontation. Facial sensation is normal. There is no facial asymmetry. 

Vestibulo-ocular reflex is intact.  Few beats of nystagmus on George-Hallpike head 

hanging left. Palate elevates and tongue protrudes in midline. All other cranial

related problems are negative except as mentioned before.Reflexes:2+ and 

symmetric with flexor plantar responses. Motor:5/5 strength with normal tone 

and bulk. Coordination:Finger-nose finger and heel-to-shin testing are normal. 

Rapid alternating movements and fine finger movements are intact. Gait:Stands 

with a wide-base, unable to take more than 1 step. Sensory:Normal pinprick, 

vibration, light touch, proprioception.





Vitals


VITALS





Vital Signs








  Date Time  Temp Pulse Resp B/P (MAP) Pulse Ox O2 Delivery O2 Flow Rate FiO2


 


10/22/21 08:02      Room Air  


 


10/22/21 07:00 97.0 74 18 157/67 (97) 95   





 97.0       











Labs


Labs





Laboratory Tests








Test


 10/21/21


12:32 10/21/21


12:38 10/21/21


12:45 10/21/21


14:10


 


Glucose (Fingerstick)


 156 mg/dL


(70-99) 


 


 





 


White Blood Count


 


 5.1 x10^3/uL


(4.0-11.0) 


 





 


Red Blood Count


 


 4.77 x10^6/uL


(3.50-5.40) 


 





 


Hemoglobin


 


 13.4 g/dL


(12.0-15.5) 


 





 


Hematocrit


 


 39.7 %


(36.0-47.0) 


 





 


Mean Corpuscular Volume  83 fL ()   


 


Mean Corpuscular Hemoglobin  28 pg (25-35)   


 


Mean Corpuscular Hemoglobin


Concent 


 34 g/dL


(31-37) 


 





 


Red Cell Distribution Width


 


 17.1 %


(11.5-14.5) 


 





 


Platelet Count


 


 172 x10^3/uL


(140-400) 


 





 


Neutrophils (%) (Auto)  41 % (31-73)   


 


Lymphocytes (%) (Auto)  49 % (24-48)   


 


Monocytes (%) (Auto)  7 % (0-9)   


 


Eosinophils (%) (Auto)  2 % (0-3)   


 


Basophils (%) (Auto)  1 % (0-3)   


 


Neutrophils # (Auto)


 


 2.1 x10^3/uL


(1.8-7.7) 


 





 


Lymphocytes # (Auto)


 


 2.5 x10^3/uL


(1.0-4.8) 


 





 


Monocytes # (Auto)


 


 0.4 x10^3/uL


(0.0-1.1) 


 





 


Eosinophils # (Auto)


 


 0.1 x10^3/uL


(0.0-0.7) 


 





 


Basophils # (Auto)


 


 0.0 x10^3/uL


(0.0-0.2) 


 





 


Sodium Level


 


 


 140 mmol/L


(136-145) 





 


Potassium Level


 


 


 3.2 mmol/L


(3.5-5.1) 





 


Chloride Level


 


 


 102 mmol/L


() 





 


Carbon Dioxide Level


 


 


 27 mmol/L


(21-32) 





 


Anion Gap   11 (6-14)  


 


Blood Urea Nitrogen


 


 


 11 mg/dL


(7-20) 





 


Creatinine


 


 


 0.8 mg/dL


(0.6-1.0) 





 


Estimated GFR


(Cockcroft-Gault) 


 


 75.3 


 





 


BUN/Creatinine Ratio   14 (6-20)  


 


Glucose Level


 


 


 151 mg/dL


(70-99) 





 


Calcium Level


 


 


 9.1 mg/dL


(8.5-10.1) 





 


Magnesium Level


 


 


 2.0 mg/dL


(1.8-2.4) 





 


Total Bilirubin


 


 


 0.7 mg/dL


(0.2-1.0) 





 


Aspartate Amino Transf


(AST/SGOT) 


 


 136 U/L


(15-37) 





 


Alanine Aminotransferase


(ALT/SGPT) 


 


 198 U/L


(14-59) 





 


Alkaline Phosphatase


 


 


 127 U/L


() 





 


Creatine Kinase


 


 


 84 U/L


() 





 


Troponin I Quantitative


 


 


 < 0.017 ng/mL


(0.000-0.055) 





 


Total Protein


 


 


 8.8 g/dL


(6.4-8.2) 





 


Albumin


 


 


 3.7 g/dL


(3.4-5.0) 





 


Albumin/Globulin Ratio   0.7 (1.0-1.7)  


 


Ethyl Alcohol Level


 


 


 < 10 mg/dL


(0-10) 





 


Urine Opiates Screen    Neg (NEG) 


 


Urine Methadone Screen    Neg (NEG) 


 


Urine Barbiturates    Neg (NEG) 


 


Urine Phencyclidine Screen    Neg (NEG) 


 


Urine


Amphetamine/Methamphetamine 


 


 


 Pos (NEG) 





 


Urine Benzodiazepines Screen    Neg (NEG) 


 


Urine Cocaine Screen    Neg (NEG) 


 


Urine Cannabinoids Screen    Neg (NEG) 


 


Urine Ethyl Alcohol    Neg (NEG) 


 


Test


 10/21/21


20:34 10/22/21


04:30 


 





 


Glucose (Fingerstick)


 146 mg/dL


(70-99) 


 


 





 


White Blood Count


 


 4.4 x10^3/uL


(4.0-11.0) 


 





 


Red Blood Count


 


 4.60 x10^6/uL


(3.50-5.40) 


 





 


Hemoglobin


 


 12.7 g/dL


(12.0-15.5) 


 





 


Hematocrit


 


 38.7 %


(36.0-47.0) 


 





 


Mean Corpuscular Volume  84 fL ()   


 


Mean Corpuscular Hemoglobin  28 pg (25-35)   


 


Mean Corpuscular Hemoglobin


Concent 


 33 g/dL


(31-37) 


 





 


Red Cell Distribution Width


 


 16.9 %


(11.5-14.5) 


 





 


Platelet Count


 


 143 x10^3/uL


(140-400) 


 





 


Neutrophils (%) (Auto)  45 % (31-73)   


 


Lymphocytes (%) (Auto)  46 % (24-48)   


 


Monocytes (%) (Auto)  7 % (0-9)   


 


Eosinophils (%) (Auto)  1 % (0-3)   


 


Basophils (%) (Auto)  0 % (0-3)   


 


Neutrophils # (Auto)


 


 2.0 x10^3/uL


(1.8-7.7) 


 





 


Lymphocytes # (Auto)


 


 2.0 x10^3/uL


(1.0-4.8) 


 





 


Monocytes # (Auto)


 


 0.3 x10^3/uL


(0.0-1.1) 


 





 


Eosinophils # (Auto)


 


 0.1 x10^3/uL


(0.0-0.7) 


 





 


Basophils # (Auto)


 


 0.0 x10^3/uL


(0.0-0.2) 


 





 


Sodium Level


 


 140 mmol/L


(136-145) 


 





 


Potassium Level


 


 3.5 mmol/L


(3.5-5.1) 


 





 


Chloride Level


 


 105 mmol/L


() 


 





 


Carbon Dioxide Level


 


 26 mmol/L


(21-32) 


 





 


Anion Gap  9 (6-14)   


 


Blood Urea Nitrogen  8 mg/dL (7-20)   


 


Creatinine


 


 0.7 mg/dL


(0.6-1.0) 


 





 


Estimated GFR


(Cockcroft-Gault) 


 87.9 


 


 





 


Glucose Level


 


 109 mg/dL


(70-99) 


 





 


Calcium Level


 


 8.0 mg/dL


(8.5-10.1) 


 





 


Phosphorus Level


 


 3.8 mg/dL


(2.6-4.7) 


 





 


Magnesium Level


 


 2.0 mg/dL


(1.8-2.4) 


 





 


Troponin I Quantitative


 


 < 0.017 ng/mL


(0.000-0.055) 


 











Laboratory Tests








Test


 10/21/21


12:32 10/21/21


12:38 10/21/21


12:45 10/21/21


14:10


 


Glucose (Fingerstick)


 156 mg/dL


(70-99) 


 


 





 


White Blood Count


 


 5.1 x10^3/uL


(4.0-11.0) 


 





 


Red Blood Count


 


 4.77 x10^6/uL


(3.50-5.40) 


 





 


Hemoglobin


 


 13.4 g/dL


(12.0-15.5) 


 





 


Hematocrit


 


 39.7 %


(36.0-47.0) 


 





 


Mean Corpuscular Volume  83 fL ()   


 


Mean Corpuscular Hemoglobin  28 pg (25-35)   


 


Mean Corpuscular Hemoglobin


Concent 


 34 g/dL


(31-37) 


 





 


Red Cell Distribution Width


 


 17.1 %


(11.5-14.5) 


 





 


Platelet Count


 


 172 x10^3/uL


(140-400) 


 





 


Neutrophils (%) (Auto)  41 % (31-73)   


 


Lymphocytes (%) (Auto)  49 % (24-48)   


 


Monocytes (%) (Auto)  7 % (0-9)   


 


Eosinophils (%) (Auto)  2 % (0-3)   


 


Basophils (%) (Auto)  1 % (0-3)   


 


Neutrophils # (Auto)


 


 2.1 x10^3/uL


(1.8-7.7) 


 





 


Lymphocytes # (Auto)


 


 2.5 x10^3/uL


(1.0-4.8) 


 





 


Monocytes # (Auto)


 


 0.4 x10^3/uL


(0.0-1.1) 


 





 


Eosinophils # (Auto)


 


 0.1 x10^3/uL


(0.0-0.7) 


 





 


Basophils # (Auto)


 


 0.0 x10^3/uL


(0.0-0.2) 


 





 


Sodium Level


 


 


 140 mmol/L


(136-145) 





 


Potassium Level


 


 


 3.2 mmol/L


(3.5-5.1) 





 


Chloride Level


 


 


 102 mmol/L


() 





 


Carbon Dioxide Level


 


 


 27 mmol/L


(21-32) 





 


Anion Gap   11 (6-14)  


 


Blood Urea Nitrogen


 


 


 11 mg/dL


(7-20) 





 


Creatinine


 


 


 0.8 mg/dL


(0.6-1.0) 





 


Estimated GFR


(Cockcroft-Gault) 


 


 75.3 


 





 


BUN/Creatinine Ratio   14 (6-20)  


 


Glucose Level


 


 


 151 mg/dL


(70-99) 





 


Calcium Level


 


 


 9.1 mg/dL


(8.5-10.1) 





 


Magnesium Level


 


 


 2.0 mg/dL


(1.8-2.4) 





 


Total Bilirubin


 


 


 0.7 mg/dL


(0.2-1.0) 





 


Aspartate Amino Transf


(AST/SGOT) 


 


 136 U/L


(15-37) 





 


Alanine Aminotransferase


(ALT/SGPT) 


 


 198 U/L


(14-59) 





 


Alkaline Phosphatase


 


 


 127 U/L


() 





 


Creatine Kinase


 


 


 84 U/L


() 





 


Troponin I Quantitative


 


 


 < 0.017 ng/mL


(0.000-0.055) 





 


Total Protein


 


 


 8.8 g/dL


(6.4-8.2) 





 


Albumin


 


 


 3.7 g/dL


(3.4-5.0) 





 


Albumin/Globulin Ratio   0.7 (1.0-1.7)  


 


Ethyl Alcohol Level


 


 


 < 10 mg/dL


(0-10) 





 


Urine Opiates Screen    Neg (NEG) 


 


Urine Methadone Screen    Neg (NEG) 


 


Urine Barbiturates    Neg (NEG) 


 


Urine Phencyclidine Screen    Neg (NEG) 


 


Urine


Amphetamine/Methamphetamine 


 


 


 Pos (NEG) 





 


Urine Benzodiazepines Screen    Neg (NEG) 


 


Urine Cocaine Screen    Neg (NEG) 


 


Urine Cannabinoids Screen    Neg (NEG) 


 


Urine Ethyl Alcohol    Neg (NEG) 


 


Test


 10/21/21


20:34 10/22/21


04:30 


 





 


Glucose (Fingerstick)


 146 mg/dL


(70-99) 


 


 





 


White Blood Count


 


 4.4 x10^3/uL


(4.0-11.0) 


 





 


Red Blood Count


 


 4.60 x10^6/uL


(3.50-5.40) 


 





 


Hemoglobin


 


 12.7 g/dL


(12.0-15.5) 


 





 


Hematocrit


 


 38.7 %


(36.0-47.0) 


 





 


Mean Corpuscular Volume  84 fL ()   


 


Mean Corpuscular Hemoglobin  28 pg (25-35)   


 


Mean Corpuscular Hemoglobin


Concent 


 33 g/dL


(31-37) 


 





 


Red Cell Distribution Width


 


 16.9 %


(11.5-14.5) 


 





 


Platelet Count


 


 143 x10^3/uL


(140-400) 


 





 


Neutrophils (%) (Auto)  45 % (31-73)   


 


Lymphocytes (%) (Auto)  46 % (24-48)   


 


Monocytes (%) (Auto)  7 % (0-9)   


 


Eosinophils (%) (Auto)  1 % (0-3)   


 


Basophils (%) (Auto)  0 % (0-3)   


 


Neutrophils # (Auto)


 


 2.0 x10^3/uL


(1.8-7.7) 


 





 


Lymphocytes # (Auto)


 


 2.0 x10^3/uL


(1.0-4.8) 


 





 


Monocytes # (Auto)


 


 0.3 x10^3/uL


(0.0-1.1) 


 





 


Eosinophils # (Auto)


 


 0.1 x10^3/uL


(0.0-0.7) 


 





 


Basophils # (Auto)


 


 0.0 x10^3/uL


(0.0-0.2) 


 





 


Sodium Level


 


 140 mmol/L


(136-145) 


 





 


Potassium Level


 


 3.5 mmol/L


(3.5-5.1) 


 





 


Chloride Level


 


 105 mmol/L


() 


 





 


Carbon Dioxide Level


 


 26 mmol/L


(21-32) 


 





 


Anion Gap  9 (6-14)   


 


Blood Urea Nitrogen  8 mg/dL (7-20)   


 


Creatinine


 


 0.7 mg/dL


(0.6-1.0) 


 





 


Estimated GFR


(Cockcroft-Gault) 


 87.9 


 


 





 


Glucose Level


 


 109 mg/dL


(70-99) 


 





 


Calcium Level


 


 8.0 mg/dL


(8.5-10.1) 


 





 


Phosphorus Level


 


 3.8 mg/dL


(2.6-4.7) 


 





 


Magnesium Level


 


 2.0 mg/dL


(1.8-2.4) 


 





 


Troponin I Quantitative


 


 < 0.017 ng/mL


(0.000-0.055) 


 














Images


Images


CT Head W/O Contrast:





History: Reason: dizziness not a stroke protocol: 





Comparison: March 3, 2021





Axial images were obtained without contrast.





There is a 1 cm x 0.4 similar hypoattenuating lesion in the right parasagittal 

frontal lobe just anterior to the anterior horn of the right lateral ventricle.





The remaining gray and white matter appears normal and symmetrical for the 

patients age.  There is no mass effect, extraaxial fluid collections or hydroc

ephalus.  There is no gross bleed.  There is no focal loss of gray-white matter 

distinction to suggest acute ischemia, i.e. stroke.





There is a gaze to the right.








Impression:  





1. Lacunar infarct on the right appears subacute or old but was not present in 

March.





2. Gaze to the right.





Assessment/Plan


Assessment/Plan


Impression:


Left vestibular neuronitis, also consider positional vertigo


Incidental right parasagittal frontal lobe lacunar infarct, no clinical 

correlation


Hypertensive encephalopathy


Substance abuse, hypertension, diabetes, poor social situation





Recommendations:


MRI of the brain


I do not think she needs full stroke work-up for this incidental finding unless 

the MRI is abnormal too showing acute abnormality


Aspirin


Check lipids


Rehabilitation modalities


Regulate blood pressure


Home when safe.





Thank you for letting me help with the patient's care.











MARYANNE ANDREWS MD           Oct 22, 2021 08:51

## 2021-10-22 NOTE — NUR
SW following. Discussed with RN, pt homeless, room air, ada diet. Neurology following - 
brain MRI scheduled. PAT consulted for Meth positive. PT/OT ordered. Med [facility]ist following 
for self pay status. [facility] will continue to follow.

-------------------------------------------------------------------------------

Addendum: 10/22/21 at 1411 by SURI ALMAZAN

-------------------------------------------------------------------------------

Brianna DO) met with pt, pt declined [facility] resources. Was provided with homeless resources.

## 2021-10-22 NOTE — NUR
This RN received phone call from Lorelei, radiology supervisor who stated MRI is down today and 
will be up on Sunday.

## 2021-10-22 NOTE — PDOC
[facility] PROGRESS NOTE


Date of Service


DOS:


DATE: 10/22/21 


TIME: 20:21





Chief Complaint


Chief Complaint


Dizziness





History of Present Illness


History of Present Illness


Hx obtained from discussion with the ED physician and chart review:





51  year old female who [place] of 

dizziness.  She describes vertigo symptoms.  She reports that she has 

experienced the same symptoms previously.  She reports that she was feeling fine

and then she had abrupt onset of dizziness while sitting playing slot machines. 

She denies fall or head injury or loss of consciousness.  She denies numbness 

tingling or focal motor weakness.  She denies chest pain or dyspnea.  She denies

abdominal pain.  She reports mild nausea, without vomiting.  She is an extremely

poor historian and does not seem to be completely willing to participate in her 

care or provide more detailed history.  She admits to using methamphetamine 

yesterday.  She has a history of recently diagnosed type 2 diabetes as well as 

hypertension.  She admits that she has not taken any of her prescribed 

medications "in a while."





10/22


Patient evaluated and examined at bedside.  Reports ongoing dizziness but really

would not say much else.  Home meds resumed today.  PT OT.  Evaluated by 

neurology today as well recommending MRI brain.  Plan of care discussed with 

bedside RN





Vitals/I&O


Vitals/I&O:





                                   Vital Signs








  Date Time  Temp Pulse Resp B/P (MAP) Pulse Ox O2 Delivery O2 Flow Rate FiO2


 


10/22/21 15:03  78  179/98    


 


10/22/21 15:00 98.1  18  96 Room Air  





 98.1       














                                    I & O   


 


 10/21/21 10/21/21 10/22/21





 15:00 23:00 07:00


 


Intake Total  60 ml 


 


Balance  60 ml 











Physical Exam


Physical Exam:


Neurologic, grossly normal.  Cranial nerves grossly intact


General:  Alert, Oriented X3, Cooperative


Heart:  Regular rate, Normal S1, Normal S2


Lungs:  Clear


Abdomen:  Normal bowel sounds, Soft, No tenderness


Extremities:  No edema, Normal pulses


Skin:  No significant lesion





Labs


Labs:





Laboratory Tests








Test


 10/21/21


20:34 10/22/21


04:30 10/22/21


11:55 10/22/21


17:00


 


Glucose (Fingerstick)


 146 mg/dL


(70-99) 


 165 mg/dL


(70-99) 136 mg/dL


(70-99)


 


White Blood Count


 


 4.4 x10^3/uL


(4.0-11.0) 


 





 


Red Blood Count


 


 4.60 x10^6/uL


(3.50-5.40) 


 





 


Hemoglobin


 


 12.7 g/dL


(12.0-15.5) 


 





 


Hematocrit


 


 38.7 %


(36.0-47.0) 


 





 


Mean Corpuscular Volume  84 fL ()   


 


Mean Corpuscular Hemoglobin  28 pg (25-35)   


 


Mean Corpuscular Hemoglobin


Concent 


 33 g/dL


(31-37) 


 





 


Red Cell Distribution Width


 


 16.9 %


(11.5-14.5) 


 





 


Platelet Count


 


 143 x10^3/uL


(140-400) 


 





 


Neutrophils (%) (Auto)  45 % (31-73)   


 


Lymphocytes (%) (Auto)  46 % (24-48)   


 


Monocytes (%) (Auto)  7 % (0-9)   


 


Eosinophils (%) (Auto)  1 % (0-3)   


 


Basophils (%) (Auto)  0 % (0-3)   


 


Neutrophils # (Auto)


 


 2.0 x10^3/uL


(1.8-7.7) 


 





 


Lymphocytes # (Auto)


 


 2.0 x10^3/uL


(1.0-4.8) 


 





 


Monocytes # (Auto)


 


 0.3 x10^3/uL


(0.0-1.1) 


 





 


Eosinophils # (Auto)


 


 0.1 x10^3/uL


(0.0-0.7) 


 





 


Basophils # (Auto)


 


 0.0 x10^3/uL


(0.0-0.2) 


 





 


Sodium Level


 


 140 mmol/L


(136-145) 


 





 


Potassium Level


 


 3.5 mmol/L


(3.5-5.1) 


 





 


Chloride Level


 


 105 mmol/L


() 


 





 


Carbon Dioxide Level


 


 26 mmol/L


(21-32) 


 





 


Anion Gap  9 (6-14)   


 


Blood Urea Nitrogen  8 mg/dL (7-20)   


 


Creatinine


 


 0.7 mg/dL


(0.6-1.0) 


 





 


Estimated GFR


(Cockcroft-Gault) 


 87.9 


 


 





 


Glucose Level


 


 109 mg/dL


(70-99) 


 





 


Calcium Level


 


 8.0 mg/dL


(8.5-10.1) 


 





 


Phosphorus Level


 


 3.8 mg/dL


(2.6-4.7) 


 





 


Magnesium Level


 


 2.0 mg/dL


(1.8-2.4) 


 





 


Troponin I Quantitative


 


 < 0.017 ng/mL


(0.000-0.055) 


 





 


Test


 10/22/21


19:54 


 


 





 


Glucose (Fingerstick)


 135 mg/dL


(70-99) 


 


 














Assessment and Plan


Assessmemt and Plan


Problems


Medical Problems:


(1) History of CVA (cerebrovascular accident)


Status: Acute  





(2) Nausea & vomiting


Status: Acute  





(3) Non-compliance


Status: Acute  





(4) Uncontrolled hypertension


Status: Acute  





(5) Vertigo


Status: Acute  





Acute toxic and metabolic encephalopathy


Mild Hypokalemia


Peripheral vertigo


Subacute to chronic thalamic infarct


Inability to ambulate


Debilitation


Methamphetamine abuse


Transaminitis





Admit to hospitalist service for further management


Ativan as needed for agitative episodes


No IV electrolytes replacement as needed


Neurology consult


Orthostatic vital signs


PT OT


Lovenox for DVT prophylaxis


Cardiac diet


CODE STATUS full


Discussed with RN and SW


Disposition inpatient management as above, patient is homeless


DPOA: Undesignated





Comment


Review of Relevant


I have reviewed the following items ta (where applicable) has been applied.


Medications:





Current Medications








 Medications


  (Trade)  Dose


 Ordered  Sig/Curits


 Route


 PRN Reason  Start Time


 Stop Time Status Last Admin


Dose Admin


 


 Enoxaparin Sodium


  (Lovenox 40mg


 Syringe)  40 mg  Q24H


 SQ


   10/22/21 09:00


    10/22/21 09:13





 


 Aspirin


  (Ecotrin)  81 mg  [id number]


 PO


   10/22/21 08:45


    10/22/21 09:12





 


 Amlodipine


 Besylate


  (Norvasc)  5 mg  DAILY


 PO


   10/22/21 15:00


    10/22/21 15:03





 


 Metformin HCl


  (Glucophage)  850 mg  [id number]


 PO


   10/22/21 17:00


    10/22/21 17:27














Justifications for Admission


Other Justification


Vertigo and Acute toxic encephalopathy











MARCELA ARORA MD         Oct 22, 2021 20:24

## 2021-10-23 VITALS — DIASTOLIC BLOOD PRESSURE: 49 MMHG | SYSTOLIC BLOOD PRESSURE: 116 MMHG

## 2021-10-23 VITALS — SYSTOLIC BLOOD PRESSURE: 144 MMHG | DIASTOLIC BLOOD PRESSURE: 83 MMHG

## 2021-10-23 VITALS — DIASTOLIC BLOOD PRESSURE: 82 MMHG | SYSTOLIC BLOOD PRESSURE: 168 MMHG

## 2021-10-23 VITALS — SYSTOLIC BLOOD PRESSURE: 128 MMHG | DIASTOLIC BLOOD PRESSURE: 66 MMHG

## 2021-10-23 VITALS — DIASTOLIC BLOOD PRESSURE: 93 MMHG | SYSTOLIC BLOOD PRESSURE: 159 MMHG

## 2021-10-23 LAB
ANION GAP SERPL CALC-SCNC: 10 MMOL/L (ref 6–14)
BASOPHILS # BLD AUTO: 0 X10^3/UL (ref 0–0.2)
BASOPHILS NFR BLD: 0 % (ref 0–3)
BUN SERPL-MCNC: 8 MG/DL (ref 7–20)
CALCIUM SERPL-MCNC: 8.2 MG/DL (ref 8.5–10.1)
CHLORIDE SERPL-SCNC: 104 MMOL/L (ref 98–107)
CHOLEST SERPL-MCNC: 164 MG/DL (ref 0–200)
CHOLEST/HDLC SERPL: 6.3 {RATIO}
CO2 SERPL-SCNC: 25 MMOL/L (ref 21–32)
CREAT SERPL-MCNC: 0.8 MG/DL (ref 0.6–1)
EOSINOPHIL NFR BLD: 0.1 X10^3/UL (ref 0–0.7)
EOSINOPHIL NFR BLD: 2 % (ref 0–3)
ERYTHROCYTE [DISTWIDTH] IN BLOOD BY AUTOMATED COUNT: 16.5 % (ref 11.5–14.5)
GFR SERPLBLD BASED ON 1.73 SQ M-ARVRAT: 75.3 ML/MIN
GLUCOSE SERPL-MCNC: 154 MG/DL (ref 70–99)
HCT VFR BLD CALC: 40.2 % (ref 36–47)
HDLC SERPL-MCNC: 26 MG/DL (ref 40–60)
HGB BLD-MCNC: 13.5 G/DL (ref 12–15.5)
LDLC: 115 MG/DL (ref 0–100)
LYMPHOCYTES # BLD: 2 X10^3/UL (ref 1–4.8)
LYMPHOCYTES NFR BLD AUTO: 44 % (ref 24–48)
MAGNESIUM SERPL-MCNC: 1.9 MG/DL (ref 1.8–2.4)
MCH RBC QN AUTO: 28 PG (ref 25–35)
MCHC RBC AUTO-ENTMCNC: 34 G/DL (ref 31–37)
MCV RBC AUTO: 84 FL (ref 79–100)
MONO #: 0.4 X10^3/UL (ref 0–1.1)
MONOCYTES NFR BLD: 8 % (ref 0–9)
NEUT #: 2.1 X10^3/UL (ref 1.8–7.7)
NEUTROPHILS NFR BLD AUTO: 45 % (ref 31–73)
PLATELET # BLD AUTO: 158 X10^3/UL (ref 140–400)
POTASSIUM SERPL-SCNC: 3.5 MMOL/L (ref 3.5–5.1)
RBC # BLD AUTO: 4.8 X10^6/UL (ref 3.5–5.4)
SODIUM SERPL-SCNC: 139 MMOL/L (ref 136–145)
TRIGL SERPL-MCNC: 117 MG/DL (ref 0–150)
VLDLC: 23 MG/DL (ref 0–40)
WBC # BLD AUTO: 4.6 X10^3/UL (ref 4–11)

## 2021-10-23 RX ADMIN — BACITRACIN SCH MLS/HR: 5000 INJECTION, POWDER, FOR SOLUTION INTRAMUSCULAR at 00:30

## 2021-10-23 RX ADMIN — METOPROLOL TARTRATE SCH MG: 25 TABLET ORAL at 09:21

## 2021-10-23 RX ADMIN — PROCHLORPERAZINE EDISYLATE PRN MG: 5 INJECTION INTRAMUSCULAR; INTRAVENOUS at 18:02

## 2021-10-23 RX ADMIN — METOPROLOL TARTRATE SCH MG: 25 TABLET ORAL at 21:17

## 2021-10-23 RX ADMIN — ASPIRIN SCH MG: 81 TABLET, COATED ORAL at 09:17

## 2021-10-23 RX ADMIN — LISINOPRIL SCH MG: 5 TABLET ORAL at 09:20

## 2021-10-23 RX ADMIN — ENOXAPARIN SODIUM SCH MG: 40 INJECTION SUBCUTANEOUS at 09:22

## 2021-10-23 NOTE — RAD
MRI BRAIN WO 



Date: 10/23/2021 11:37 AM 



Indication:  abnormal head CT, dizzy   



Comparison:  CT 10/21/2020. 



Technique: Multiplanar multisequence MRI of the brain was performed without intravenous contrast usin
g the standard protocol.



Findings: 



No acute infarct. No acute or chronic hemorrhage. The ventricles are normal in size and configuration
 without hydrocephalus. Mild scattered FLAIR hyperintensities in the subcortical, pericallosal, and p
eriventricular deep white matter.



The scalp and calvarium are normal. The pituitary and sella are normal. No Chiari malformation. The v
isualized upper cervical spine is normal.



The visualized orbits and globes are normal. The visualized paranasal sinuses are clear. Right mastoi
d fluid.



Normal flow voids within the vertebral, basilar, and internal carotid arteries indicating patency.



IMPRESSION:



1. No acute infarct, acute hemorrhage, mass, or hydrocephalus.



2. Mild scattered FLAIR hyperintensities in the subcortical and periventricular deep white matter, a 
nonspecific finding which can be seen with chronic small vessel ischemic disease, sequela of chronic 
migraines, demyelinating disease, drug toxicity, prior infection (Lyme disease), prior injury, or vas
culitis.



Electronically signed by: Minh Rosales MD (10/23/2021 12:25 PM) [facility]

## 2021-10-23 NOTE — NUR
Nurse's note:

The patient and significant other is asking for replacement of metformin due to liver 
issues; will pass in shift report.

## 2021-10-23 NOTE — PDOC
[employer] PROGRESS NOTE


Date of Service


DOS:


DATE: 10/23/21 


TIME: 10:10





Chief Complaint


Chief Complaint


Acute toxic and metabolic encephalopathy


Mild Hypokalemia


Peripheral vertigo


Subacute to chronic thalamic infarct


Inability to ambulate


Debilitation


Methamphetamine abuse


Transaminitis





Admit to hospitalist service for further management


Ativan as needed for agitative episodes


No IV electrolytes replacement as needed


Neurology consult


Orthostatic vital signs


PT OT


Lovenox for DVT prophylaxis


Cardiac diet


CODE STATUS full


Discussed with RN and SW


Disposition inpatient management as above, patient is homeless


DPOA: Undesignated





History of Present Illness


History of Present Illness


Hx obtained from discussion with the ED physician and chart review:





51  year old female who [place] 

dizziness.  She describes vertigo symptoms.  She reports that she has e

xperienced the same symptoms previously.  She reports that she was feeling fine 

and then she had abrupt onset of dizziness while sitting playing slot machines. 

She denies fall or head injury or loss of consciousness.  She denies numbness 

tingling or focal motor weakness.  She denies chest pain or dyspnea.  She denies

abdominal pain.  She reports mild nausea, without vomiting.  She is an extremely

poor historian and does not seem to be completely willing to participate in her 

care or provide more detailed history.  She admits to using methamphetamine 

yesterday.  She has a history of recently diagnosed type 2 diabetes as well as 

hypertension.  She admits that she has not taken any of her prescribed 

medications "in a while."





10/22


Patient evaluated and examined at bedside.  Reports ongoing dizziness but really

would not say much else.  Home meds resumed today.  PT OT.  Evaluated by ne

urology today as well recommending MRI brain.  Plan of care discussed with 

bedside RN





10/23


Patient evaluated examined at bedside.  Does have some ongoing dizziness which 

she says.  PT OT essentially cleared patient for home discharge with outpatient 

follow-up.  However patient still needs MRI.  Will be able to plan further once 

MRI completed





Vitals/I&O


Vitals/I&O:





                                   Vital Signs








  Date Time  Temp Pulse Resp B/P (MAP) Pulse Ox O2 Delivery O2 Flow Rate FiO2


 


10/23/21 09:21  69  168/62    


 


10/23/21 07:00 97.8  16  97 Room Air  





 97.8       














                                    I & O   


 


 10/22/21 10/22/21 10/23/21





 14:59 22:59 06:59


 


Intake Total  1240 ml 


 


Balance  1240 ml 











Physical Exam


Physical Exam:


Neurologic, grossly normal.  Cranial nerves grossly intact


General:  Alert, Oriented X3, Cooperative


Heart:  Regular rate, Normal S1, Normal S2


Lungs:  Clear


Abdomen:  Normal bowel sounds, Soft, No tenderness


Extremities:  No edema, Normal pulses


Skin:  No significant lesion





Labs


Labs:





Laboratory Tests








Test


 10/22/21


11:55 10/22/21


17:00 10/22/21


19:54 10/23/21


06:50


 


Glucose (Fingerstick)


 165 mg/dL


(70-99) 136 mg/dL


(70-99) 135 mg/dL


(70-99) 





 


White Blood Count


 


 


 


 4.6 x10^3/uL


(4.0-11.0)


 


Red Blood Count


 


 


 


 4.80 x10^6/uL


(3.50-5.40)


 


Hemoglobin


 


 


 


 13.5 g/dL


(12.0-15.5)


 


Hematocrit


 


 


 


 40.2 %


(36.0-47.0)


 


Mean Corpuscular Volume    84 fL () 


 


Mean Corpuscular Hemoglobin    28 pg (25-35) 


 


Mean Corpuscular Hemoglobin


Concent 


 


 


 34 g/dL


(31-37)


 


Red Cell Distribution Width


 


 


 


 16.5 %


(11.5-14.5)


 


Platelet Count


 


 


 


 158 x10^3/uL


(140-400)


 


Neutrophils (%) (Auto)    45 % (31-73) 


 


Lymphocytes (%) (Auto)    44 % (24-48) 


 


Monocytes (%) (Auto)    8 % (0-9) 


 


Eosinophils (%) (Auto)    2 % (0-3) 


 


Basophils (%) (Auto)    0 % (0-3) 


 


Neutrophils # (Auto)


 


 


 


 2.1 x10^3/uL


(1.8-7.7)


 


Lymphocytes # (Auto)


 


 


 


 2.0 x10^3/uL


(1.0-4.8)


 


Monocytes # (Auto)


 


 


 


 0.4 x10^3/uL


(0.0-1.1)


 


Eosinophils # (Auto)


 


 


 


 0.1 x10^3/uL


(0.0-0.7)


 


Basophils # (Auto)


 


 


 


 0.0 x10^3/uL


(0.0-0.2)


 


Sodium Level


 


 


 


 139 mmol/L


(136-145)


 


Potassium Level


 


 


 


 3.5 mmol/L


(3.5-5.1)


 


Chloride Level


 


 


 


 104 mmol/L


()


 


Carbon Dioxide Level


 


 


 


 25 mmol/L


(21-32)


 


Anion Gap    10 (6-14) 


 


Blood Urea Nitrogen    8 mg/dL (7-20) 


 


Creatinine


 


 


 


 0.8 mg/dL


(0.6-1.0)


 


Estimated GFR


(Cockcroft-Gault) 


 


 


 75.3 





 


Glucose Level


 


 


 


 154 mg/dL


(70-99)


 


Calcium Level


 


 


 


 8.2 mg/dL


(8.5-10.1)


 


Magnesium Level


 


 


 


 1.9 mg/dL


(1.8-2.4)


 


Triglycerides Level


 


 


 


 117 mg/dL


(0-150)


 


Cholesterol Level


 


 


 


 164 mg/dL


(0-200)


 


LDL Cholesterol, Calculated


 


 


 


 115 mg/dL


(0-100)


 


VLDL Cholesterol, Calculated


 


 


 


 23 mg/dL


(0-40)


 


Non-HDL Cholesterol Calculated


 


 


 


 138 mg/dL


(0-129)


 


HDL Cholesterol


 


 


 


 26 mg/dL


(40-60)


 


Cholesterol/HDL Ratio    6.3 


 


Test


 10/23/21


07:42 


 


 





 


Glucose (Fingerstick)


 154 mg/dL


(70-99) 


 


 














Assessment and Plan


Assessmemt and Plan


Problems


Medical Problems:


(1) History of CVA (cerebrovascular accident)


Status: Acute  





(2) Nausea & vomiting


Status: Acute  





(3) Non-compliance


Status: Acute  





(4) Uncontrolled hypertension


Status: Acute  





(5) Vertigo


Status: Acute  











Comment


Review of Relevant


I have reviewed the following items ta (where applicable) has been applied.


Medications:





Current Medications








 Medications


  (Trade)  Dose


 Ordered  Sig/Curtis


 Route


 PRN Reason  Start Time


 Stop Time Status Last Admin


Dose Admin


 


 Amlodipine


 Besylate


  (Norvasc)  5 mg  DAILY


 PO


   10/22/21 15:00


    10/23/21 09:21





 


 Lisinopril


  (Prinivil)  5 mg  DAILY


 PO


   10/23/21 09:00


    10/23/21 09:20





 


 Metformin HCl


  (Glucophage)  850 mg  BIDWMEALS


 PO


   10/22/21 17:00


    10/23/21 09:17





 


 Metoprolol


 Tartrate


  (Lopressor)  25 mg  BID


 PO


   10/22/21 21:00


    10/23/21 09:21














Justifications for Admission


Other Justification


Vertigo and Acute toxic encephalopathy











MARCELA ARORA MD         Oct 23, 2021 10:11

## 2021-10-24 VITALS — SYSTOLIC BLOOD PRESSURE: 190 MMHG | DIASTOLIC BLOOD PRESSURE: 90 MMHG

## 2021-10-24 VITALS — SYSTOLIC BLOOD PRESSURE: 117 MMHG | DIASTOLIC BLOOD PRESSURE: 80 MMHG

## 2021-10-24 VITALS — SYSTOLIC BLOOD PRESSURE: 176 MMHG | DIASTOLIC BLOOD PRESSURE: 88 MMHG

## 2021-10-24 VITALS — SYSTOLIC BLOOD PRESSURE: 162 MMHG | DIASTOLIC BLOOD PRESSURE: 93 MMHG

## 2021-10-24 VITALS — SYSTOLIC BLOOD PRESSURE: 179 MMHG | DIASTOLIC BLOOD PRESSURE: 99 MMHG

## 2021-10-24 VITALS — DIASTOLIC BLOOD PRESSURE: 88 MMHG | SYSTOLIC BLOOD PRESSURE: 150 MMHG

## 2021-10-24 LAB
ANION GAP SERPL CALC-SCNC: 9 MMOL/L (ref 6–14)
BASOPHILS # BLD AUTO: 0 X10^3/UL (ref 0–0.2)
BASOPHILS NFR BLD: 0 % (ref 0–3)
BUN SERPL-MCNC: 10 MG/DL (ref 7–20)
CALCIUM SERPL-MCNC: 8.8 MG/DL (ref 8.5–10.1)
CHLORIDE SERPL-SCNC: 103 MMOL/L (ref 98–107)
CO2 SERPL-SCNC: 26 MMOL/L (ref 21–32)
CREAT SERPL-MCNC: 0.7 MG/DL (ref 0.6–1)
EOSINOPHIL NFR BLD: 0.1 X10^3/UL (ref 0–0.7)
EOSINOPHIL NFR BLD: 2 % (ref 0–3)
ERYTHROCYTE [DISTWIDTH] IN BLOOD BY AUTOMATED COUNT: 16.5 % (ref 11.5–14.5)
GFR SERPLBLD BASED ON 1.73 SQ M-ARVRAT: 87.9 ML/MIN
GLUCOSE SERPL-MCNC: 153 MG/DL (ref 70–99)
HCT VFR BLD CALC: 42.5 % (ref 36–47)
HGB BLD-MCNC: 14.2 G/DL (ref 12–15.5)
LYMPHOCYTES # BLD: 1.8 X10^3/UL (ref 1–4.8)
LYMPHOCYTES NFR BLD AUTO: 38 % (ref 24–48)
MAGNESIUM SERPL-MCNC: 1.8 MG/DL (ref 1.8–2.4)
MCH RBC QN AUTO: 28 PG (ref 25–35)
MCHC RBC AUTO-ENTMCNC: 33 G/DL (ref 31–37)
MCV RBC AUTO: 84 FL (ref 79–100)
MONO #: 0.4 X10^3/UL (ref 0–1.1)
MONOCYTES NFR BLD: 8 % (ref 0–9)
NEUT #: 2.6 X10^3/UL (ref 1.8–7.7)
NEUTROPHILS NFR BLD AUTO: 53 % (ref 31–73)
PLATELET # BLD AUTO: 174 X10^3/UL (ref 140–400)
POTASSIUM SERPL-SCNC: 3.5 MMOL/L (ref 3.5–5.1)
RBC # BLD AUTO: 5.05 X10^6/UL (ref 3.5–5.4)
SODIUM SERPL-SCNC: 138 MMOL/L (ref 136–145)
WBC # BLD AUTO: 4.9 X10^3/UL (ref 4–11)

## 2021-10-24 RX ADMIN — ASPIRIN SCH MG: 81 TABLET, COATED ORAL at 12:15

## 2021-10-24 RX ADMIN — METOPROLOL TARTRATE SCH MG: 25 TABLET ORAL at 12:15

## 2021-10-24 RX ADMIN — METOPROLOL TARTRATE SCH MG: 25 TABLET ORAL at 20:35

## 2021-10-24 RX ADMIN — ENOXAPARIN SODIUM SCH MG: 40 INJECTION SUBCUTANEOUS at 12:16

## 2021-10-24 RX ADMIN — LISINOPRIL SCH MG: 5 TABLET ORAL at 12:15

## 2021-10-24 RX ADMIN — ATORVASTATIN CALCIUM SCH MG: 20 TABLET, FILM COATED ORAL at 20:33

## 2021-10-24 NOTE — PDOC
[employer] PROGRESS NOTE


Date of Service


DOS:


DATE: 10/24/21 


TIME: 12:24





Chief Complaint


Chief Complaint


Acute toxic and metabolic encephalopathy


Mild Hypokalemia


Peripheral vertigo


Subacute to chronic thalamic infarct


Inability to ambulate


Debilitation


Methamphetamine abuse


Transaminitis





Admit to hospitalist service for further management


Ativan as needed for agitative episodes


No IV electrolytes replacement as needed


Neurology consult


Orthostatic vital signs


PT OT


Lovenox for DVT prophylaxis


Cardiac diet


CODE STATUS full


Discussed with RN and SW


Disposition inpatient management as above, patient is homeless


DPOA: Undesignated





History of Present Illness


History of Present Illness


Hx obtained from discussion with the ED physician and chart review:





51  year old female who [place] 

dizziness.  She describes vertigo symptoms.  She reports that she has e

xperienced the same symptoms previously.  She reports that she was feeling fine 

and then she had abrupt onset of dizziness while sitting playing slot machines. 

She denies fall or head injury or loss of consciousness.  She denies numbness 

tingling or focal motor weakness.  She denies chest pain or dyspnea.  She denies

abdominal pain.  She reports mild nausea, without vomiting.  She is an extremely

poor historian and does not seem to be completely willing to participate in her 

care or provide more detailed history.  She admits to using methamphetamine 

yesterday.  She has a history of recently diagnosed type 2 diabetes as well as 

hypertension.  She admits that she has not taken any of her prescribed 

medications "in a while."





10/22


Patient evaluated and examined at bedside.  Reports ongoing dizziness but really

would not say much else.  Home meds resumed today.  PT OT.  Evaluated by ne

urology today as well recommending MRI brain.  Plan of care discussed with 

bedside RN





10/23


Patient evaluated examined at bedside.  Does have some ongoing dizziness which 

she says.  PT OT essentially cleared patient for home discharge with outpatient 

follow-up.  However patient still needs MRI.  Will be able to plan further once 

MRI completed





10/24


Patient evaluated examined at bedside.  MRI performed yesterday today pretty 

normal.  Patient does endorse ongoing dizziness which I do think is related to 

her drug use however a methamphetamine should be out of her system by now.  Will

order echo to check for further work-up.  She does maybe have mild fluid 

overload on exam.





Vitals/I&O


Vitals/I&O:





                                   Vital Signs








  Date Time  Temp Pulse Resp B/P (MAP) Pulse Ox O2 Delivery O2 Flow Rate FiO2


 


10/24/21 12:15  66  179/99    


 


10/24/21 11:00 98.7  16  94 Room Air  





 98.7       














                                    I & O   


 


 10/23/21 10/23/21 10/24/21





 15:00 23:00 07:00


 


Intake Total  120 ml 30 ml


 


Balance  120 ml 30 ml











Physical Exam


Physical Exam:


Neurologic, grossly normal.  Cranial nerves grossly intact


General:  Alert, Oriented X3, Cooperative


Heart:  Regular rate, Normal S1, Normal S2


Lungs:  Clear


Abdomen:  Normal bowel sounds, Soft, No tenderness


Extremities:  No edema, Normal pulses


Skin:  No significant lesion





Labs


Labs:





Laboratory Tests








Test


 10/23/21


17:03 10/23/21


19:35 10/24/21


06:40 10/24/21


07:55


 


Glucose (Fingerstick)


 153 mg/dL


(70-99) 197 mg/dL


(70-99) 


 144 mg/dL


(70-99)


 


White Blood Count


 


 


 4.9 x10^3/uL


(4.0-11.0) 





 


Red Blood Count


 


 


 5.05 x10^6/uL


(3.50-5.40) 





 


Hemoglobin


 


 


 14.2 g/dL


(12.0-15.5) 





 


Hematocrit


 


 


 42.5 %


(36.0-47.0) 





 


Mean Corpuscular Volume   84 fL ()  


 


Mean Corpuscular Hemoglobin   28 pg (25-35)  


 


Mean Corpuscular Hemoglobin


Concent 


 


 33 g/dL


(31-37) 





 


Red Cell Distribution Width


 


 


 16.5 %


(11.5-14.5) 





 


Platelet Count


 


 


 174 x10^3/uL


(140-400) 





 


Neutrophils (%) (Auto)   53 % (31-73)  


 


Lymphocytes (%) (Auto)   38 % (24-48)  


 


Monocytes (%) (Auto)   8 % (0-9)  


 


Eosinophils (%) (Auto)   2 % (0-3)  


 


Basophils (%) (Auto)   0 % (0-3)  


 


Neutrophils # (Auto)


 


 


 2.6 x10^3/uL


(1.8-7.7) 





 


Lymphocytes # (Auto)


 


 


 1.8 x10^3/uL


(1.0-4.8) 





 


Monocytes # (Auto)


 


 


 0.4 x10^3/uL


(0.0-1.1) 





 


Eosinophils # (Auto)


 


 


 0.1 x10^3/uL


(0.0-0.7) 





 


Basophils # (Auto)


 


 


 0.0 x10^3/uL


(0.0-0.2) 





 


Sodium Level


 


 


 138 mmol/L


(136-145) 





 


Potassium Level


 


 


 3.5 mmol/L


(3.5-5.1) 





 


Chloride Level


 


 


 103 mmol/L


() 





 


Carbon Dioxide Level


 


 


 26 mmol/L


(21-32) 





 


Anion Gap   9 (6-14)  


 


Blood Urea Nitrogen


 


 


 10 mg/dL


(7-20) 





 


Creatinine


 


 


 0.7 mg/dL


(0.6-1.0) 





 


Estimated GFR


(Cockcroft-Gault) 


 


 87.9 


 





 


Glucose Level


 


 


 153 mg/dL


(70-99) 





 


Calcium Level


 


 


 8.8 mg/dL


(8.5-10.1) 





 


Magnesium Level


 


 


 1.8 mg/dL


(1.8-2.4) 





 


Test


 10/24/21


11:57 


 


 





 


Glucose (Fingerstick)


 138 mg/dL


(70-99) 


 


 














Assessment and Plan


Assessmemt and Plan


Problems


Medical Problems:


(1) History of CVA (cerebrovascular accident)


Status: Acute  





(2) Nausea & vomiting


Status: Acute  





(3) Non-compliance


Status: Acute  





(4) Uncontrolled hypertension


Status: Acute  





(5) Vertigo


Status: Acute  











Comment


Review of Relevant


I have reviewed the following items ta (where applicable) has been applied.





Justifications for Admission


Other Justification


Vertigo and Acute toxic encephalopathy











MARCELA ARORA MD         Oct 24, 2021 12:25

## 2021-10-25 VITALS — DIASTOLIC BLOOD PRESSURE: 99 MMHG | SYSTOLIC BLOOD PRESSURE: 176 MMHG

## 2021-10-25 VITALS — DIASTOLIC BLOOD PRESSURE: 5 MMHG | SYSTOLIC BLOOD PRESSURE: 169 MMHG

## 2021-10-25 VITALS — DIASTOLIC BLOOD PRESSURE: 72 MMHG | SYSTOLIC BLOOD PRESSURE: 152 MMHG

## 2021-10-25 VITALS — DIASTOLIC BLOOD PRESSURE: 91 MMHG | SYSTOLIC BLOOD PRESSURE: 207 MMHG

## 2021-10-25 VITALS — SYSTOLIC BLOOD PRESSURE: 164 MMHG | DIASTOLIC BLOOD PRESSURE: 94 MMHG

## 2021-10-25 VITALS — DIASTOLIC BLOOD PRESSURE: 95 MMHG | SYSTOLIC BLOOD PRESSURE: 190 MMHG

## 2021-10-25 VITALS — SYSTOLIC BLOOD PRESSURE: 168 MMHG | DIASTOLIC BLOOD PRESSURE: 91 MMHG

## 2021-10-25 RX ADMIN — ASPIRIN SCH MG: 81 TABLET, COATED ORAL at 09:45

## 2021-10-25 RX ADMIN — MECLIZINE PRN MG: 12.5 TABLET ORAL at 13:43

## 2021-10-25 RX ADMIN — INSULIN LISPRO SCH UNITS: 100 INJECTION, SOLUTION INTRAVENOUS; SUBCUTANEOUS at 13:46

## 2021-10-25 RX ADMIN — LISINOPRIL SCH MG: 5 TABLET ORAL at 09:45

## 2021-10-25 RX ADMIN — MECLIZINE PRN MG: 12.5 TABLET ORAL at 18:20

## 2021-10-25 RX ADMIN — METOPROLOL TARTRATE SCH MG: 25 TABLET ORAL at 09:45

## 2021-10-25 RX ADMIN — INSULIN LISPRO SCH UNITS: 100 INJECTION, SOLUTION INTRAVENOUS; SUBCUTANEOUS at 18:20

## 2021-10-25 RX ADMIN — ATORVASTATIN CALCIUM SCH MG: 20 TABLET, FILM COATED ORAL at 20:51

## 2021-10-25 RX ADMIN — ENOXAPARIN SODIUM SCH MG: 40 INJECTION SUBCUTANEOUS at 09:45

## 2021-10-25 RX ADMIN — ZOLPIDEM TARTRATE PRN MG: 5 TABLET ORAL at 20:51

## 2021-10-25 RX ADMIN — METOPROLOL TARTRATE SCH MG: 25 TABLET ORAL at 20:51

## 2021-10-25 NOTE — PDOC
PROGRESS NOTES


Date of Service


DATE: 10/25/21 


TIME: 08:37


Assessment


Problems


Medical Problems:


(1) History of CVA (cerebrovascular accident)


Status: Acute  





(2) Nausea & vomiting


Status: Acute  





(3) Non-compliance


Status: Acute  





(4) Uncontrolled hypertension


Status: Acute  





(5) Vertigo


Status: Acute  





Left vestibular neuronitis, also consider positional vertigo, no longer has 

nystagmus


Incidental right parasagittal frontal lobe lacunar infarct, no clinical 

correlation.  MRI shows white matter changes, no acute infarct


Hypertensive encephalopathy


Substance abuse, hypertension, diabetes, poor social situation, homeless


Fairly favorable lipid profile


Plan


Aspirin


Statin started


Meclizine was discontinued last night


Rehabilitation modalities


Regulate blood pressure


Home when safe.


Subjective


Dizziness is a little better


Objective





Vital Signs








  Date Time  Temp Pulse Resp B/P (MAP) Pulse Ox O2 Delivery O2 Flow Rate FiO2


 


10/25/21 03:00 97.9 65 20 168/91 (116) 92 Room Air  





 97.9       














Intake and Output 


 


 10/25/21





 07:00


 


Intake Total 480 ml


 


Balance 480 ml


 


 


 


Intake Oral 480 ml








PHYSICAL EXAM


Alert. Oriented to time, place and person.


PERRL.


EOMI. no nystagmus elicited on George-Hallpike and head impulse testing


CN: no focal findings.


Muscle tone: normal.


Muscle strength: 5/5


DTR: 2+


Plantar reflex: Flexor


Gait: not examined in bed.


Sensory exam: no abnormal findings.


No cerebellar signs elicited.


Review of Relevant


I have reviewed the following items ta (where applicable) has been applied.


Labs





Laboratory Tests








Test


 10/23/21


17:03 10/23/21


19:35 10/24/21


06:40 10/24/21


07:55


 


Glucose (Fingerstick)


 153 mg/dL


(70-99) 197 mg/dL


(70-99) 


 144 mg/dL


(70-99)


 


White Blood Count


 


 


 4.9 x10^3/uL


(4.0-11.0) 





 


Red Blood Count


 


 


 5.05 x10^6/uL


(3.50-5.40) 





 


Hemoglobin


 


 


 14.2 g/dL


(12.0-15.5) 





 


Hematocrit


 


 


 42.5 %


(36.0-47.0) 





 


Mean Corpuscular Volume   84 fL ()  


 


Mean Corpuscular Hemoglobin   28 pg (25-35)  


 


Mean Corpuscular Hemoglobin


Concent 


 


 33 g/dL


(31-37) 





 


Red Cell Distribution Width


 


 


 16.5 %


(11.5-14.5) 





 


Platelet Count


 


 


 174 x10^3/uL


(140-400) 





 


Neutrophils (%) (Auto)   53 % (31-73)  


 


Lymphocytes (%) (Auto)   38 % (24-48)  


 


Monocytes (%) (Auto)   8 % (0-9)  


 


Eosinophils (%) (Auto)   2 % (0-3)  


 


Basophils (%) (Auto)   0 % (0-3)  


 


Neutrophils # (Auto)


 


 


 2.6 x10^3/uL


(1.8-7.7) 





 


Lymphocytes # (Auto)


 


 


 1.8 x10^3/uL


(1.0-4.8) 





 


Monocytes # (Auto)


 


 


 0.4 x10^3/uL


(0.0-1.1) 





 


Eosinophils # (Auto)


 


 


 0.1 x10^3/uL


(0.0-0.7) 





 


Basophils # (Auto)


 


 


 0.0 x10^3/uL


(0.0-0.2) 





 


Sodium Level


 


 


 138 mmol/L


(136-145) 





 


Potassium Level


 


 


 3.5 mmol/L


(3.5-5.1) 





 


Chloride Level


 


 


 103 mmol/L


() 





 


Carbon Dioxide Level


 


 


 26 mmol/L


(21-32) 





 


Anion Gap   9 (6-14)  


 


Blood Urea Nitrogen


 


 


 10 mg/dL


(7-20) 





 


Creatinine


 


 


 0.7 mg/dL


(0.6-1.0) 





 


Estimated GFR


(Cockcroft-Gault) 


 


 87.9 


 





 


Glucose Level


 


 


 153 mg/dL


(70-99) 





 


Calcium Level


 


 


 8.8 mg/dL


(8.5-10.1) 





 


Magnesium Level


 


 


 1.8 mg/dL


(1.8-2.4) 





 


Test


 10/24/21


11:57 10/24/21


17:20 10/24/21


19:42 10/25/21


07:54


 


Glucose (Fingerstick)


 138 mg/dL


(70-99) 123 mg/dL


(70-99) 186 mg/dL


(70-99) 157 mg/dL


(70-99)








Laboratory Tests








Test


 10/24/21


11:57 10/24/21


17:20 10/24/21


19:42 10/25/21


07:54


 


Glucose (Fingerstick)


 138 mg/dL


(70-99) 123 mg/dL


(70-99) 186 mg/dL


(70-99) 157 mg/dL


(70-99)








Medications





Current Medications


Sodium Chloride 1,000 ml @  1,000 mls/hr 1X  ONCE IV  Last administered on 

10/21/21at 12:55;  Start 10/21/21 at 12:45;  Stop 10/21/21 at 13:44;  Status DC


Ondansetron HCl (Zofran) 4 mg 1X  ONCE IVP  Last administered on 10/21/21at 

16:11;  Start 10/21/21 at 16:00;  Stop 10/21/21 at 16:01;  Status DC


Ketorolac Tromethamine (Toradol 15mg Vial) 15 mg 1X  ONCE IVP  Last administered

on 10/21/21at 16:52;  Start 10/21/21 at 16:15;  Stop 10/21/21 at 16:20;  Status 

DC


Diphenhydramine HCl (Benadryl) 25 mg 1X  ONCE IVP  Last administered on 10/2

1/21at 16:52;  Start 10/21/21 at 16:15;  Stop 10/21/21 at 16:20;  Status DC


Labetalol HCl (Normodyne Iv Push) 10 mg 1X  ONCE IVP  Last administered on 

10/21/21at 16:53;  Start 10/21/21 at 16:15;  Stop 10/21/21 at 16:20;  Status DC


Ondansetron HCl (Zofran) 4 mg PRN Q8HRS  PRN IVP NAUSEA/VOMITING;  Start 

10/21/21 at 16:30;  Stop 10/22/21 at 16:29;  Status DC


Sodium Chloride 1,000 ml @  125 mls/hr 1X  ONCE IV  Last administered on 

10/21/21at 16:55;  Start 10/21/21 at 16:30;  Stop 10/22/21 at 00:29;  Status DC


Aspirin (Ecotrin) 325 mg 1X  ONCE PO  Last administered on 10/21/21at 20:27;  

Start 10/21/21 at 17:00;  Stop 10/21/21 at 17:01;  Status DC


Labetalol HCl (Normodyne Iv Push) 10 mg 1X  ONCE IVP ;  Start 10/21/21 at 17:30;

 Stop 10/21/21 at 17:31;  Status DC


Sennosides (Senna) 17.2 mg PRN BID  PRN PO CONSTIPATION;  Start 10/21/21 at 

18:30


Docusate Sodium (Colace) 100 mg PRN DAILY  PRN PO HARD STOOLS;  Start 10/21/21 

at 18:30


Ondansetron HCl (Zofran) 4 mg PRN Q6HRS  PRN IVP NAUSEA/VOMITING, 1ST CHOICE 

Last administered on 10/23/21at 11:21;  Start 10/21/21 at 18:30


Dextrose (Dextrose 50%-Water Syringe) 12.5 gm PRN Q15MIN  PRN IV SEE COMMENTS;  

Start 10/21/21 at 18:30


Sodium Chloride 1,000 ml @  100 mls/hr Q10H IV  Last administered on 10/23/21at 

00:30;  Start 10/21/21 at 18:30;  Stop 10/23/21 at 16:48;  Status DC


Acetaminophen (Tylenol) 650 mg PRN Q4HRS  PRN PO TEMP OVER 100.4F OR MILD PAIN; 

Start 10/21/21 at 18:30


Lorazepam (Ativan) 0.5 mg PRN Q6HRS  PRN PO ANXIETY / AGITATION Last 

administered on 10/22/21at 21:55;  Start 10/21/21 at 18:30


Lorazepam (Ativan Inj) 0.25 mg PRN Q4HRS  PRN IV ANXIETY / AGITATION;  Start 

10/21/21 at 18:30


Enoxaparin Sodium (Lovenox 40mg Syringe) 40 mg Q24H SQ  Last administered on 

10/24/21at 12:16;  Start 10/22/21 at 09:00


Prochlorperazine Edisylate (Compazine) 10 mg PRN Q6HRS  PRN IV NAUSEA/VOMITING, 

2ND CHOICE Last administered on 10/23/21at 18:02;  Start 10/21/21 at 18:30


Zolpidem Tartrate (Ambien) 2.5 mg PRN QHS  PRN PO INSOMNIA Last administered on 

10/22/21at 21:55;  Start 10/21/21 at 18:30


Hydralazine HCl (Apresoline Inj) 10 mg PRN Q4HRS  PRN IVP ELEV BP,SEE 

COMMENT,2ND CHOICE;  Start 10/21/21 at 18:30


Labetalol HCl (Normodyne Iv Push) 20 mg PRN Q2HRS  PRN IVP HYPERTENSION, 1ST 

CHOICE;  Start 10/21/21 at 18:30


Aspirin (Ecotrin) 81 mg DAILYWBKFT PO  Last administered on 10/24/21at 12:15;  

Start 10/22/21 at 08:45


Amlodipine Besylate (Norvasc) 5 mg DAILY PO  Last administered on 10/24/21at 

12:14;  Start 10/22/21 at 15:00


Lisinopril (Prinivil) 5 mg DAILY PO  Last administered on 10/24/21at 12:15;  

Start 10/23/21 at 09:00


Metformin HCl (Glucophage) 850 mg BIDWMEALS PO  Last administered on 10/24/21at 

12:14;  Start 10/22/21 at 17:00


Metoprolol Tartrate (Lopressor) 25 mg BID PO  Last administered on 10/24/21at 

20:35;  Start 10/22/21 at 21:00


Atorvastatin Calcium (Lipitor) 20 mg QHS PO  Last administered on 10/24/21at 

20:33;  Start 10/24/21 at 21:00


Meclizine HCl (Antivert) 25 mg 1X  ONCE PO  Last administered on 10/24/21at 

20:33;  Start 10/24/21 at 19:00;  Stop 10/24/21 at 19:01;  Status DC





Active Scripts


Active


Lisinopril 5 Mg Tablet 1 Tab PO DAILY


[id number] -40 (Butalb/Acetaminophen/Caffeine) 1 Each Tablet 1 

Tab PO PRN Q6HRS PRN


Doxycycline Hyclate 100 Mg Tablet 1 Tab PO BID


Glucophage (Metformin Hcl) 850 Mg Tablet 850 Mg PO BIDWMEALS


Amlodipine Besylate 5 Mg Tablet 5 Mg PO DAILY


Metoprolol Tartrate 25 Mg Tablet 25 Mg PO BID


Reported


No Known Medications Prior To Admisstion (Info)  Each 1 Each  UNK


Vitals/I & O





Vital Sign - Last 24 Hours








 10/24/21 10/24/21 10/24/21 10/24/21





 11:00 12:14 12:15 12:15


 


Temp 98.7   





 98.7   


 


Pulse 72 66 66 66


 


Resp 16   


 


B/P (MAP) 190/90 (123) 179/99 179/99 179/99


 


Pulse Ox 94   


 


O2 Delivery Room Air   


 


    





    





 10/24/21 10/24/21 10/24/21 10/24/21





 15:00 19:00 20:15 20:35


 


Temp 98.7 98.0  





 98.7 98.0  


 


Pulse 70 76  76


 


Resp 16 20  


 


B/P (MAP) 150/88 (108) 117/80 (92)  117/80


 


Pulse Ox 94 96  


 


O2 Delivery Room Air Room Air Room Air 


 


    





    





 10/24/21 10/25/21  





 23:00 03:00  


 


Temp 97.7 97.9  





 97.7 97.9  


 


Pulse 65 65  


 


Resp 17 20  


 


B/P (MAP) 162/93 (116) 168/91 (116)  


 


Pulse Ox 92 92  


 


O2 Delivery Room Air Room Air  














Intake and Output0   


 


 10/24/21 10/24/21 10/25/21





 15:00 23:00 07:00


 


Intake Total  300 ml 180 ml


 


Balance  300 ml 180 ml








Images


MRI BRAIN WO 





Date: 10/23/2021 11:37 AM 





Indication:  abnormal head CT, dizzy   





Comparison:  CT 10/21/2020. 





Technique: Multiplanar multisequence MRI of the brain was performed without 

intravenous contrast using the standard protocol.





Findings: 





No acute infarct. No acute or chronic hemorrhage. The ventricles are normal in 

size and configuration without hydrocephalus. Mild scattered FLAIR 

hyperintensities in the subcortical, pericallosal, and periventricular deep 

white matter.





The scalp and calvarium are normal. The pituitary and sella are normal. No 

Chiari malformation. The visualized upper cervical spine is normal.





The visualized orbits and globes are normal. The visualized paranasal sinuses 

are clear. Right mastoid fluid.





Normal flow voids within the vertebral, basilar, and internal carotid arteries 

indicating patency.





IMPRESSION:





1. No acute infarct, acute hemorrhage, mass, or hydrocephalus.





2. Mild scattered FLAIR hyperintensities in the subcortical and periventricular 

deep white matter, a nonspecific finding which can be seen with chronic small 

vessel ischemic disease, sequela of chronic migraines, demyelinating disease, 

drug toxicity, prior infection (Lyme disease), prior injury, or vasculitis.





Justicifation of Admission Dx:


Justifications for Admission:


Justification of Admission Dx:  No











MARYANNE ANDREWS MD           Oct 25, 2021 08:42

## 2021-10-25 NOTE — CARD
MR#: [id number]

Account#: [id number]

Accession#: [id number]

Date of Study: 10/25/2021

Ordering Physician: MARCELA ARORA, 

Referring Physician: MARCELA ARORA, 

Tech: [facility]





--------------- APPROVED REPORT --------------





EXAM: Two-dimensional and M-mode echocardiogram with Doppler and color Doppler.



Other Information 

Quality : FairHR: 60bpm

Rhythm : NSRTechnically limited study due to  body habitus.



INDICATION

Dizziness and Vertigo 



RISK FACTORS

Hypertension 

Obesity   

Diabetes

Smoking 



2D DIMENSIONS 

Left Atrium(2D)3.9 (1.6-4.0cm)IVSd1.6 (0.7-1.1cm)

Aortic Root(2D)3.1 (2.0-3.7cm)LVDd3.5 (3.9-5.9cm)

LVOT Diameter1.9 (1.8-2.4cm)PWd1.5 (0.7-1.1cm)

LVDs2.2 (2.5-4.0cm)FS (%) 37.8 %

SV35.4 mlLVEF(%)69.0 (>50%)



Aortic Valve

AoV Peak Francois.103.3cm/sAoV VTI19.3cm

AO Peak GR.4.3mmHgLVOT Peak Francois.76.0cm/s

LVOT  VTI 19.27cmAO Mean GR.2mmHg

ZA (VMAX)1.92cf1MFI   (VTI)2.92cm2



Mitral Valve

MV E Ghyltcwc34.3cm/sMV DECEL [id number]

MV A Prpmcdtc96.2cm/sMV [id number]

E/A  Ratio0.7MVA (PHT)3.14cm2



TDI

E/Lateral E'15.2E/Medial E'14.9



Pulmonary Valve

PV Peak Hyvmfyon43.0cm/sPV Peak Grad.3mmHg



Tricuspid Valve

TR P. Kvgpdkhh793xt/sTR Peak Gr.14mmHg



Pulmonary Vein

S1 Mhxzufgk78.2cm/sD2 Adioixpx75.1cm/s



 LEFT VENTRICLE 

The left ventricle is normal size. There is moderate concentric left ventricular hypertrophy. The lef
t ventricular systolic function is normal and the ejection fraction is within normal range. EF 55% Th
ere is normal LV segmental wall motion. Tissue Doppler imaging reveals mild left ventricular diastoli
c dysfunction. No left ventricle thrombus noted on this study. There is no ventricular septal defect 
visualized. There is no left ventricular aneurysm. There is no mass noted in the left ventricle.



 RIGHT VENTRICLE 

The right ventricle is normal size. There is normal right ventricular wall thickness. The right ventr
icular systolic function is normal.



 ATRIA 

The left atrium is moderately dilated. The right atrium size is normal. The interatrial septum is int
act with no evidence for an atrial septal defect or patent foramen ovale as noted on 2-D or Doppler i
maging.



 AORTIC VALVE 

The aortic valve is not well visualized. Grossly appears trileaflet. Doppler and Color Flow revealed 
no significant aortic regurgitation. There is no significant aortic valvular stenosis. There is no ao
rtic valvular vegetation.



 MITRAL VALVE 

Mitral annular calcification is mild. There is no evidence of mitral valve prolapse. There is no mitr
al valve stenosis. Doppler and Color-flow revealed trace to mild mitral regurgitation.



 TRICUSPID VALVE 

The tricuspid valve is normal in structure and function. Doppler and Color Flow revealed trace to mil
d tricuspid regurgitation. There is no tricuspid valve prolapse or vegetation. There is no tricuspid 
valve stenosis.



 PULMONIC VALVE 

The pulmonic valve is not well seen. Doppler and Color Flow revealed no pulmonic valvular regurgitati
on. There is no pulmonic valvular stenosis.



 GREAT VESSELS 

The aortic root is normal in size. The ascending aorta is normal in size. The IVC is normal in size a
nd collapses >50% with inspiration.



 PERICARDIAL EFFUSION 

There is no pleural effusion. There is no evidence of significant pericardial effusion.



Critical Notification

Critical Value: No



<Conclusion>

The left ventricular systolic function is normal and the ejection fraction is within normal range. EF
 55%

There is normal LV segmental wall motion.

Technically difficult study. 



Signed by : Ilya Zimmer, 

Electronically Approved : 10/25/2021 14:09:28

## 2021-10-25 NOTE — PDOC
[employer] PROGRESS NOTE


Date of Service


DOS:


DATE: 10/25/21 


TIME: 11:31





Chief Complaint


Chief Complaint


Acute toxic and metabolic encephalopathy


Mild Hypokalemia


Dizziness - Peripheral vertigo. Left vestibular neuronitis, also consider 

positional vertigo, no longer has nystagmus


Subacute to chronic thalamic infarct - Incidental right parasagittal frontal 

lobe lacunar infarct, no clinical correlation.


Inability to ambulate


Debilitation


Methamphetamine abuse


Transaminitis


Hypertensive encephalopathy


Substance abuse, hypertension, diabetes, poor social situation, homeless








Admit to hospitalist service for further management


Ativan as needed for agitative episodes


No IV electrolytes replacement as needed


Neurology consult


Orthostatic vital signs


PT OT


Lovenox for DVT prophylaxis


Cardiac diet


CODE STATUS full


Discussed with RN and SW


Disposition inpatient management as above, patient is homeless


DPOA: Undesignated





History of Present Illness


History of Present Illness


51  year old female who [place] 

d[place]iness.  She describes vertigo symptoms.  She reports that she has 

experienced the same symptoms previously.  She reports that she was feeling fine

and then she had abrupt onset of dizziness while sitting playing slot machines. 

She denies fall or head injury or loss of consciousness.  She denies numbness 

tingling or focal motor weakness.  She denies chest pain or dyspnea.  She denies

abdominal pain.  She reports mild nausea, without vomiting.  She is an extremely

poor historian and does not seem to be completely willing to participate in her 

care or provide more detailed history.  She admits to using methamphetamine 

yesterday.  She has a history of recently diagnosed type 2 diabetes as well as 

hypertension.  She admits that she has not taken any of her prescribed 

medications "in a while."





10/22: Reports ongoing dizziness but really would not say much else.  Home meds 

resumed today.  PT OT.  Evaluated by neurology today as well recommending MRI 

brain.


10/23: Does have some ongoing dizziness which she says.  PT OT essentially 

cleared patient for home discharge with outpatient follow-up.  However patient 

still needs MRI.


10/24: Patient evaluated examined at bedside.  MRI performed yesterday today 

pretty normal.  Patient does endorse ongoing dizziness which I do think is 

related to her drug use however a methamphetamine should be out of her system by

now.  Will order echo to check for further work-up.  She does maybe have mild 

fluid overload on exam.





Systolic blood pressure 207 today.  Dizziness minimally improved with 

repositioning exercises per PT. Getting echo currently. No CP or SOB. Will 

increase amlodipine, f/u echo results.





Vitals/I&O


Vitals/I&O:





                                   Vital Signs








  Date Time  Temp Pulse Resp B/P (MAP) Pulse Ox O2 Delivery O2 Flow Rate FiO2


 


10/25/21 11:00 97.5 78 20 207/91 (129) 98 Room Air  





 97.5       














                                    I & O   


 


 10/24/21 10/24/21 10/25/21





 15:00 23:00 07:00


 


Intake Total  300 ml 180 ml


 


Balance  300 ml 180 ml











Physical Exam


Physical Exam:


Neurologic, grossly normal.  Cranial nerves grossly intact


General:  Alert, Oriented X3, Cooperative


Heart:  Regular rate, Normal S1, Normal S2


Lungs:  Clear


Abdomen:  Normal bowel sounds, Soft, No tenderness


Extremities:  No edema, Normal pulses


Skin:  No significant lesion





Labs


Labs:





Laboratory Tests








Test


 10/24/21


11:57 10/24/21


17:20 10/24/21


19:42 10/25/21


07:54


 


Glucose (Fingerstick)


 138 mg/dL


(70-99) 123 mg/dL


(70-99) 186 mg/dL


(70-99) 157 mg/dL


(70-99)











Assessment and Plan


Assessmemt and Plan


Problems


Medical Problems:


(1) History of CVA (cerebrovascular accident)


Status: Acute  





(2) Nausea & vomiting


Status: Acute  





(3) Non-compliance


Status: Acute  





(4) Uncontrolled hypertension


Status: Acute  





(5) Vertigo


Status: Acute  











Comment


Review of Relevant


I have reviewed the following items ta (where applicable) has been applied.


Medications:





Current Medications








 Medications


  (Trade)  Dose


 Ordered  Sig/Curtis


 Route


 PRN Reason  Start Time


 Stop Time Status Last Admin


Dose Admin


 


 Atorvastatin


 Calcium


  (Lipitor)  20 mg  QHS


 PO


   10/24/21 21:00


    10/24/21 20:33





 


 Meclizine HCl


  (Antivert)  25 mg  1X  ONCE


 PO


   10/24/21 19:00


 10/24/21 19:01 DC 10/24/21 20:33














Justifications for Admission


Other Justification


Vertigo and Acute toxic encephalopathy











MARCELA ESPINOZA MD        Oct 25, 2021 11:36

## 2021-10-26 VITALS — DIASTOLIC BLOOD PRESSURE: 115 MMHG | SYSTOLIC BLOOD PRESSURE: 169 MMHG

## 2021-10-26 VITALS — SYSTOLIC BLOOD PRESSURE: 167 MMHG | DIASTOLIC BLOOD PRESSURE: 98 MMHG

## 2021-10-26 VITALS — SYSTOLIC BLOOD PRESSURE: 142 MMHG | DIASTOLIC BLOOD PRESSURE: 72 MMHG

## 2021-10-26 VITALS — DIASTOLIC BLOOD PRESSURE: 76 MMHG | SYSTOLIC BLOOD PRESSURE: 109 MMHG

## 2021-10-26 VITALS — DIASTOLIC BLOOD PRESSURE: 67 MMHG | SYSTOLIC BLOOD PRESSURE: 115 MMHG

## 2021-10-26 RX ADMIN — LISINOPRIL SCH MG: 5 TABLET ORAL at 09:00

## 2021-10-26 RX ADMIN — METOPROLOL TARTRATE SCH MG: 25 TABLET ORAL at 09:00

## 2021-10-26 RX ADMIN — INSULIN LISPRO SCH UNITS: 100 INJECTION, SOLUTION INTRAVENOUS; SUBCUTANEOUS at 12:00

## 2021-10-26 RX ADMIN — PROCHLORPERAZINE EDISYLATE PRN MG: 5 INJECTION INTRAMUSCULAR; INTRAVENOUS at 09:05

## 2021-10-26 RX ADMIN — ASPIRIN SCH MG: 81 TABLET, COATED ORAL at 09:04

## 2021-10-26 RX ADMIN — INSULIN LISPRO SCH UNITS: 100 INJECTION, SOLUTION INTRAVENOUS; SUBCUTANEOUS at 09:15

## 2021-10-26 RX ADMIN — ENOXAPARIN SODIUM SCH MG: 40 INJECTION SUBCUTANEOUS at 09:05

## 2021-10-26 NOTE — PDOC3
Discharge Summary


Visit Information


Date of Admission:  Oct 22, 2021


Date of Discharge:  Oct 26, 2021


Admitting Diagnosis:  Vertigo


Final Diagnosis


Problems


Medical Problems:


(1) History of CVA (cerebrovascular accident)


Status: Acute  





(2) Nausea & vomiting


Status: Acute  





(3) Non-compliance


Status: Acute  





(4) Uncontrolled hypertension


Status: Acute  





(5) Vertigo


Status: Acute  











Brief Hospital Course


Allergies





                                    Allergies








Coded Allergies Type Severity Reaction Last Updated Verified


 


  No Known Drug Allergies    11/27/17 No








Vital Signs





Vital Signs








  Date Time  Temp Pulse Resp B/P (MAP) Pulse Ox O2 Delivery O2 Flow Rate FiO2


 


10/26/21 10:57  80  173/112 (132)    


 


10/26/21 10:56 98.5  18  98 Room Air  





 98.5       








Lab Results





Laboratory Tests








Test


 10/24/21


17:20 10/24/21


19:42 10/25/21


07:54 10/25/21


11:51


 


Glucose (Fingerstick)


 123 mg/dL


(70-99) 186 mg/dL


(70-99) 157 mg/dL


(70-99) 176 mg/dL


(70-99)


 


Test


 10/25/21


16:24 10/25/21


20:16 10/26/21


07:55 10/26/21


11:55


 


Glucose (Fingerstick)


 168 mg/dL


(70-99) 163 mg/dL


(70-99) 176 mg/dL


(70-99) 113 mg/dL


(70-99)








Laboratory Tests








Test


 10/25/21


16:24 10/25/21


20:16 10/26/21


07:55 10/26/21


11:55


 


Glucose (Fingerstick)


 168 mg/dL


(70-99) 163 mg/dL


(70-99) 176 mg/dL


(70-99) 113 mg/dL


(70-99)








Brief Hospital Course


51  year old female who [place] 

d[place]iness.  She describes vertigo symptoms.  She reports that she has 

experienced the same symptoms previously.  She reports that she was feeling fine

and then she had abrupt onset of dizziness while sitting playing slot machines. 

She denies fall or head injury or loss of consciousness.  She denies numbness 

tingling or focal motor weakness.  She denies chest pain or dyspnea.  She denies

abdominal pain.  She reports mild nausea, without vomiting.  She is an extremely

poor historian and does not seem to be completely willing to participate in her 

care or provide more detailed history.  She admits to using methamphetamine 

yesterday.  She has a history of recently diagnosed type 2 diabetes as well as 

hypertension.  She admits that she has not taken any of her prescribed 

medications "in a while."





10/22: Reports ongoing dizziness but really would not say much else.  Home meds 

resumed today.  PT OT.  Evaluated by neurology today as well recommending MRI 

brain.


10/23: Does have some ongoing dizziness which she says.  PT OT essentially 

cleared patient for home discharge with outpatient follow-up.  However patient 

still needs MRI.


10/24: Patient evaluated examined at bedside.  MRI performed yesterday today 

pretty normal.  Patient does endorse ongoing dizziness which I do think is 

related to her drug use however a methamphetamine should be out of her system by

now.  Will order echo to check for further work-up.  She does maybe have mild 

fluid overload on exam.


10/25: Systolic blood pressure 207 today.  Dizziness minimally improved with 

repositioning exercises per PT. Getting echo currently. No CP or SOB. Increased 

amlodipine





10/26/2021 echo results with no acute abnormalities.  BP a little low this 

morning.  Dizziness improved but still feels like she is listing a little to the

left when getting up to go to the bathroom.  She found the walker extremely 

helpful.  She is tearful about her social situation as her son who is 30 years 

old and has schizophrenia lives with her in a shed behind a person's home notes 

that she had is infested with rats and does not have water or power 

availability.  [place].  She notes she 

does have a daughter who has a vehicle in stable housing but they have not been 

in good touch.  Discussed with neurology she is improved and stable for 

discharge.





Consults: Neurology consulted





Echo: The left ventricular systolic function is normal and the ejection fraction

is within normal range. EF 55%


There is normal LV segmental wall motion.


Technically difficult study. 





Problem list:


Acute toxic and metabolic encephalopathy


Mild Hypokalemia


Dizziness - Peripheral vertigo. Left vestibular neuronitis, also consider 

positional vertigo, no longer has nystagmus


Subacute to chronic thalamic infarct - Incidental right parasagittal frontal 

lobe lacunar infarct, no clinical correlation.


Inability to ambulate - improved with PT and walker assistance


Debilitation


Methamphetamine abuse


Transaminitis


Hypertensive encephalopathy - improved back on meds


Substance abuse, hypertension, diabetes, poor social situation, homeless





Plan


Aspirin


Statin started


Meclizine PRN, not to be used more than a week or 2 at a time


Rehabilitation modalities


 blood pressure monitoring outpatient


Walker at home





Discharge Information


Condition at Discharge:  Improved


Follow Up:  Weeks


Disposition/Orders:  D/C to Home


Scheduled


Amlodipine Besylate (Amlodipine Besylate) 10 Mg Tablet, 10 MG PO DAILY for HTN, 

#30 Ref 5


   Prescribed by: MARCELA ESPINOZA MD on 10/26/21 0720


Atorvastatin Calcium (Atorvastatin Calcium) 20 Mg Tablet, 20 MG PO QHS for HLD 

for 30 Days, #30 Ref 5


   Prescribed by: MARCELA ESPINOZA MD on 10/26/21 0720


Lisinopril (Lisinopril) 5 Mg Tablet, 1 TAB PO DAILY for blood pressure for 30 

Days, #30 Ref 5


   Prescribed by: MARCELA ESPINOZA MD on 10/26/21 0720


Metformin Hcl (Glucophage) 850 Mg Tablet, 850 MG PO BIDWMEALS for diabetes, #60 

Ref 1


   Prescribed by: HAJA SHAH on 3/3/21 1042


   Last Action: Continued on 10/22/21 1410 by MARCELA ARORA MD


Metoprolol Tartrate (Metoprolol Tartrate) 25 Mg Tablet, 25 MG PO BID for high 

blood pressure for 30 Days, #60 Ref 5


   Prescribed by: MARCELA ESPINOZA MD on 10/26/21 0720





Scheduled PRN


Meclizine Hcl (Meclizine Hcl) 12.5 Mg Tablet, 12.5 MG PO PRN Q6HRS PRN for 

DIZZINESS for 5 Days, #10


   Prescribed by: MARCELA ESPINOZA MD on 10/26/21 0720





Discontinued Medications


Amlodipine Besylate (Amlodipine Besylate) 5 Mg Tablet, 5 MG PO DAILY for high 

blood pressure, #30 Ref 1


   Prescribed by: HAJA SHAH on 3/3/21 1042


   Last Action: Continued on 10/22/21 1410 by MARCELA ARORA MD


Butalb/Acetaminophen/Caffeine (Xxpjoh-Iwhckwmu-Rpxb -40) 1 Each Tablet, 1 

TAB PO PRN Q6HRS PRN for MIGRAINE HEADACHE, #30


   Prescribed by: HAJA SHAH on 3/4/21 1033


   Last Action: HELD on 10/22/21 1410 by MARCELA ARORA MD


Doxycycline Hyclate (Doxycycline Hyclate) 100 Mg Tablet, 1 TAB PO BID for skin 

lesions, #14


   Prescribed by: HAJA SHAH on 3/3/21 1042


   Last Action: HELD on 10/22/21 1410 by MARCELA ARORA MD


Info (No Known Medications Prior To Admisstion)  Each, 1 EACH  unk for 

unknown, (Reported)


   Entered as Reported by: KATIE REYNOLDS on 2/28/21 1939


   Last Action: HELD on 10/22/21 1410 by MARCELA ARORA MD





Justicifation of Admission Dx:


Justifications for Admission:


Justification of Admission Dx:  No











MARCELA ESPINOZA MD        Oct 26, 2021 12:28

## 2021-10-26 NOTE — NUR
pt is to be discharged home with self care, there are 3 scripts sent to her pharm. pt paid 
for a walker ($20) so that she could take it with her. Destin DC RN

## 2021-10-26 NOTE — PDOC
[employer] PROGRESS NOTE


Date of Service


DOS:


DATE: 10/26/21 


TIME: 07:17





Chief Complaint


Chief Complaint


Acute toxic and metabolic encephalopathy


Mild Hypokalemia


Dizziness - Peripheral vertigo. Left vestibular neuronitis, also consider 

positional vertigo, no longer has nystagmus


Subacute to chronic thalamic infarct - Incidental right parasagittal frontal 

lobe lacunar infarct, no clinical correlation.


Inability to ambulate


Debilitation


Methamphetamine abuse


Transaminitis


Hypertensive encephalopathy


Substance abuse, hypertension, diabetes, poor social situation, homeless








Admit to hospitalist service for further management


Ativan as needed for agitative episodes


No IV electrolytes replacement as needed


Neurology consult


Orthostatic vital signs


PT OT


Lovenox for DVT prophylaxis


Cardiac diet


CODE STATUS full


Discussed with RN and SW


Disposition inpatient management as above, patient is homeless


DPOA: Undesignated





History of Present Illness


History of Present Illness


51  year old female who [place] of 

dizziness.  She describes vertigo symptoms.  She reports that she has 

experienced the same symptoms previously.  She reports that she was feeling fine

and then she had abrupt onset of dizziness while sitting playing slot machines. 

She denies fall or head injury or loss of consciousness.  She denies numbness 

tingling or focal motor weakness.  She denies chest pain or dyspnea.  She denies

abdominal pain.  She reports mild nausea, without vomiting.  She is an extremely

poor historian and does not seem to be completely willing to participate in her 

care or provide more detailed history.  She admits to using methamphetamine 

yesterday.  She has a history of recently diagnosed type 2 diabetes as well as 

hypertension.  She admits that she has not taken any of her prescribed 

medications "in a while."





10/22: Reports ongoing dizziness but really would not say much else.  Home meds 

resumed today.  PT OT.  Evaluated by neurology today as well recommending MRI 

brain.


10/23: Does have some ongoing dizziness which she says.  PT OT essentially 

cleared patient for home discharge with outpatient follow-up.  However patient 

still needs MRI.


10/24: Patient evaluated examined at bedside.  MRI performed yesterday today 

pretty normal.  Patient does endorse ongoing dizziness which I do think is 

related to her drug use however a methamphetamine should be out of her system by

now.  Will order echo to check for further work-up.  She does maybe have mild 

fluid overload on exam.


10/25: Systolic blood pressure 207 today.  Dizziness minimally improved with 

repositioning exercises per PT. Getting echo currently. No CP or SOB. Increased 

amlodipine





 echo results with no acute abnormalities.  BP a little low this morning.  

Dizziness improved but still feels like she is listing a little to the left when

getting up to go to the bathroom.  She found the walker extremely helpful.  She 

is tearful about her social situation as her son who is 30 years old and has 

schizophrenia lives with her in a shed behind a person's home notes that she had

is infested with rats and does not have water or power availability.  She 

typically goes to the [employer] during the day.  She notes she does have a daughter

who has a vehicle in stable housing but they have not been in good touch.  Disc

ussed with neurology she is improved and stable for discharge.





Vitals/I&O


Vitals/I&O:





                                   Vital Signs








  Date Time  Temp Pulse Resp B/P (MAP) Pulse Ox O2 Delivery O2 Flow Rate FiO2


 


10/26/21 03:30 98.2 64 16 142/72 (95) 95 Room Air  





 98.2       














                                    I & O   


 


 10/25/21 10/25/21 10/26/21





 15:00 23:00 07:00


 


Intake Total 280 ml 100 ml 130 ml


 


Output Total   1 ml


 


Balance 280 ml 100 ml 129 ml











Physical Exam


Physical Exam:


Neurologic, grossly normal.  Cranial nerves grossly intact


General:  Alert, Oriented X3, Cooperative


Heart:  Regular rate, Normal S1, Normal S2


Lungs:  Clear


Abdomen:  Normal bowel sounds, Soft, No tenderness


Extremities:  No edema, Normal pulses


Skin:  No significant lesion





Labs


Labs:





Laboratory Tests








Test


 10/25/21


07:54 10/25/21


11:51 10/25/21


16:24 10/25/21


20:16


 


Glucose (Fingerstick)


 157 mg/dL


(70-99) 176 mg/dL


(70-99) 168 mg/dL


(70-99) 163 mg/dL


(70-99)











Assessment and Plan


Assessmemt and Plan


Problems


Medical Problems:


(1) History of CVA (cerebrovascular accident)


Status: Acute  





(2) Nausea & vomiting


Status: Acute  





(3) Non-compliance


Status: Acute  





(4) Uncontrolled hypertension


Status: Acute  





(5) Vertigo


Status: Acute  











Comment


Review of Relevant


I have reviewed the following items ta (where applicable) has been applied.


Medications:





Current Medications








 Medications


  (Trade)  Dose


 Ordered  Sig/Curtis


 Route


 PRN Reason  Start Time


 Stop Time Status Last Admin


Dose Admin


 


 Meclizine HCl


  (Antivert)  12.5 mg  PRN Q6HRS  PRN


 PO


 DIZZINESS  10/25/21 11:30


    10/25/21 18:20





 


 Insulin Human


 Lispro


  (HumaLOG)  0-9 UNITS  TIDWMEALS


 SQ


   10/25/21 12:00


    10/25/21 18:20














Justifications for Admission


Other Justification


Vertigo and Acute toxic encephalopathy











MARCELA ESPINOZA MD        Oct 26, 2021 07:23

## 2021-10-26 NOTE — NUR
pt was picked up at the main entrance at 1700 by [facility] and taken to her ex-husbands house in 
[facility]. Destin DC RN

## 2021-10-26 NOTE — PDOC
PROGRESS NOTES


Date of Service


DATE: 10/26/21 


TIME: 08:17


Assessment


Problems


Medical Problems:


(1) History of CVA (cerebrovascular accident)


Status: Acute  





(2) Nausea & vomiting


Status: Acute  





(3) Non-compliance


Status: Acute  





(4) Uncontrolled hypertension


Status: Acute  





(5) Vertigo


Status: Acute  





Left vestibular neuronitis, also consider positional vertigo, no longer has 

nystagmus


Incidental right parasagittal frontal lobe lacunar infarct, no clinical 

correlation.  MRI shows white matter changes, no acute infarct


Hypertensive encephalopathy


Substance abuse, hypertension, diabetes, poor social situation, homeless


Fairly favorable lipid profile


Plan


Aspirin


Statin started


Meclizine PRN, not to be used more than a week or 2 at a time


Rehabilitation modalities


Regulate blood pressure


Okay for discharge


Neurology signs off


Subjective


Denies dizziness in bed


Objective





Vital Signs








  Date Time  Temp Pulse Resp B/P (MAP) Pulse Ox O2 Delivery O2 Flow Rate FiO2


 


10/26/21 03:30 98.2 64 16 142/72 (95) 95 Room Air  





 98.2       














Intake and Output 


 


 10/26/21





 07:00


 


Intake Total 510 ml


 


Output Total 1 ml


 


Balance 509 ml


 


 


 


Intake Oral 510 ml


 


Output Urine Total 1 ml








PHYSICAL EXAM


Alert. Oriented to time, place and person.


PERRL.


EOMI. no nystagmus elicited


CN: no focal findings.


Muscle tone: normal.


Muscle strength: 5/5


DTR: 2+


Plantar reflex: Flexor


Gait: not examined in bed.


Sensory exam: no abnormal findings.


No cerebellar signs elicited.


Review of Relevant


I have reviewed the following items ta (where applicable) has been applied.


Labs





Laboratory Tests








Test


 10/24/21


11:57 10/24/21


17:20 10/24/21


19:42 10/25/21


07:54


 


Glucose (Fingerstick)


 138 mg/dL


(70-99) 123 mg/dL


(70-99) 186 mg/dL


(70-99) 157 mg/dL


(70-99)


 


Test


 10/25/21


11:51 10/25/21


16:24 10/25/21


20:16 10/26/21


07:55


 


Glucose (Fingerstick)


 176 mg/dL


(70-99) 168 mg/dL


(70-99) 163 mg/dL


(70-99) 176 mg/dL


(70-99)








Laboratory Tests








Test


 10/25/21


11:51 10/25/21


16:24 10/25/21


20:16 10/26/21


07:55


 


Glucose (Fingerstick)


 176 mg/dL


(70-99) 168 mg/dL


(70-99) 163 mg/dL


(70-99) 176 mg/dL


(70-99)








Medications





Current Medications


Sodium Chloride 1,000 ml @  1,000 mls/hr 1X  ONCE IV  Last administered on 

10/21/21at 12:55;  Start 10/21/21 at 12:45;  Stop 10/21/21 at 13:44;  Status DC


Ondansetron HCl (Zofran) 4 mg 1X  ONCE IVP  Last administered on 10/21/21at 

16:11;  Start 10/21/21 at 16:00;  Stop 10/21/21 at 16:01;  Status DC


Ketorolac Tromethamine (Toradol 15mg Vial) 15 mg 1X  ONCE IVP  Last administered

on 10/21/21at 16:52;  Start 10/21/21 at 16:15;  Stop 10/21/21 at 16:20;  Status 

DC


Diphenhydramine HCl (Benadryl) 25 mg 1X  ONCE IVP  Last administered on 

10/21/21at 16:52;  Start 10/21/21 at 16:15;  Stop 10/21/21 at 16:20;  Status DC


Labetalol HCl (Normodyne Iv Push) 10 mg 1X  ONCE IVP  Last administered on 

10/21/21at 16:53;  Start 10/21/21 at 16:15;  Stop 10/21/21 at 16:20;  Status DC


Ondansetron HCl (Zofran) 4 mg PRN Q8HRS  PRN IVP NAUSEA/VOMITING;  Start 

10/21/21 at 16:30;  Stop 10/22/21 at 16:29;  Status DC


Sodium Chloride 1,000 ml @  125 mls/hr 1X  ONCE IV  Last administered on 

10/21/21at 16:55;  Start 10/21/21 at 16:30;  Stop 10/22/21 at 00:29;  Status DC


Aspirin (Ecotrin) 325 mg 1X  ONCE PO  Last administered on 10/21/21at 20:27;  

Start 10/21/21 at 17:00;  Stop 10/21/21 at 17:01;  Status DC


Labetalol HCl (Normodyne Iv Push) 10 mg 1X  ONCE IVP ;  Start 10/21/21 at 17:30;

 Stop 10/21/21 at 17:31;  Status DC


Sennosides (Senna) 17.2 mg PRN BID  PRN PO CONSTIPATION;  Start 10/21/21 at 

18:30


Docusate Sodium (Colace) 100 mg PRN DAILY  PRN PO HARD STOOLS;  Start 10/21/21 

at 18:30


Ondansetron HCl (Zofran) 4 mg PRN Q6HRS  PRN IVP NAUSEA/VOMITING, 1ST CHOICE 

Last administered on 10/23/21at 11:21;  Start 10/21/21 at 18:30


Dextrose (Dextrose 50%-Water Syringe) 12.5 gm PRN Q15MIN  PRN IV SEE COMMENTS;  

Start 10/21/21 at 18:30;  Stop 10/25/21 at 11:34;  Status DC


Sodium Chloride 1,000 ml @  100 mls/hr Q10H IV  Last administered on 10/23/21at 

00:30;  Start 10/21/21 at 18:30;  Stop 10/23/21 at 16:48;  Status DC


Acetaminophen (Tylenol) 650 mg PRN Q4HRS  PRN PO TEMP OVER 100.4F OR MILD PAIN; 

Start 10/21/21 at 18:30


Lorazepam (Ativan) 0.5 mg PRN Q6HRS  PRN PO ANXIETY / AGITATION Last 

administered on 10/25/21at 20:51;  Start 10/21/21 at 18:30


Lorazepam (Ativan Inj) 0.25 mg PRN Q4HRS  PRN IV ANXIETY / AGITATION;  Start 

10/21/21 at 18:30


Enoxaparin Sodium (Lovenox 40mg Syringe) 40 mg Q24H SQ  Last administered on 

10/25/21at 09:45;  Start 10/22/21 at 09:00


Prochlorperazine Edisylate (Compazine) 10 mg PRN Q6HRS  PRN IV NAUSEA/VOMITING, 

2ND CHOICE Last administered on 10/23/21at 18:02;  Start 10/21/21 at 18:30


Zolpidem Tartrate (Ambien) 2.5 mg PRN QHS  PRN PO INSOMNIA Last administered on 

10/25/21at 20:51;  Start 10/21/21 at 18:30


Hydralazine HCl (Apresoline Inj) 10 mg PRN Q4HRS  PRN IVP ELEV BP,SEE 

COMMENT,2ND CHOICE;  Start 10/21/21 at 18:30


Labetalol HCl (Normodyne Iv Push) 20 mg PRN Q2HRS  PRN IVP HYPERTENSION, 1ST 

CHOICE;  Start 10/21/21 at 18:30


Aspirin (Ecotrin) 81 mg DAILYWBKFT PO  Last administered on 10/25/21at 09:45;  

Start 10/22/21 at 08:45


Amlodipine Besylate (Norvasc) 5 mg DAILY PO  Last administered on 10/24/21at 

12:14;  Start 10/22/21 at 15:00;  Stop 10/25/21 at 11:31;  Status DC


Lisinopril (Prinivil) 5 mg DAILY PO  Last administered on 10/25/21at 09:45;  

Start 10/23/21 at 09:00


Metformin HCl (Glucophage) 850 mg BIDWMEALS PO  Last administered on 10/24/21at 

12:14;  Start 10/22/21 at 17:00


Metoprolol Tartrate (Lopressor) 25 mg BID PO  Last administered on 10/25/21at 

20:51;  Start 10/22/21 at 21:00


Atorvastatin Calcium (Lipitor) 20 mg QHS PO  Last administered on 10/25/21at 

20:51;  Start 10/24/21 at 21:00


Meclizine HCl (Antivert) 25 mg 1X  ONCE PO  Last administered on 10/24/21at 

20:33;  Start 10/24/21 at 19:00;  Stop 10/24/21 at 19:01;  Status DC


Meclizine HCl (Antivert) 12.5 mg PRN Q6HRS  PRN PO DIZZINESS Last administered 

on 10/25/21at 18:20;  Start 10/25/21 at 11:30


Amlodipine Besylate (Norvasc) 10 mg DAILY PO ;  Start 10/26/21 at 09:00


Insulin Human Lispro (HumaLOG) 0-9 UNITS TIDWMEALS SQ  Last administered on 

10/25/21at 18:20;  Start 10/25/21 at 12:00


Dextrose (Dextrose 50%-Water Syringe) 12.5 gm PRN Q15MIN  PRN IV SEE COMMENTS;  

Start 10/25/21 at 11:30


Ondansetron HCl (Zofran Odt) 4 mg PRN Q4HRS  PRN PO NAUSEA;  Start 10/25/21 at 

13:00


Linagliptin (Tradjenta) 5 mg DAILY PO ;  Start 10/26/21 at 09:00





Active Scripts


Active


Amlodipine Besylate 10 Mg Tablet 10 Mg PO DAILY


Meclizine Hcl 12.5 Mg Tablet 12.5 Mg PO PRN Q6HRS PRN 5 Days


Atorvastatin Calcium 20 Mg Tablet 20 Mg PO QHS 30 Days


Lisinopril 5 Mg Tablet 1 Tab PO DAILY 30 Days


Metoprolol Tartrate 25 Mg Tablet 25 Mg PO BID 30 Days


Glucophage (Metformin Hcl) 850 Mg Tablet 850 Mg PO BIDWMEALS


Vitals/I & O





Vital Sign - Last 24 Hours








 10/25/21 10/25/21 10/25/21 10/25/21





 09:45 09:45 11:00 11:58


 


Temp   97.5 





   97.5 


 


Pulse 67 67 78 59


 


Resp   20 


 


B/P (MAP) 190/95 190/95 207/91 (129) 169/85 (113)


 


Pulse Ox   98 


 


O2 Delivery   Room Air 


 


    





    





 10/25/21 10/25/21 10/25/21 10/25/21





 15:00 19:38 20:00 20:51


 


Temp 98.6 98.4  





 98.6 98.4  


 


Pulse 70 75  75


 


Resp 20 16  


 


B/P (MAP) 164/94 (117) 176/99 (124)  176/99


 


Pulse Ox 93 94  


 


O2 Delivery Room Air Room Air Room Air 


 


    





    





 10/25/21 10/26/21  





 23:08 03:30  


 


Temp 98.2 98.2  





 98.2 98.2  


 


Pulse 78 64  


 


Resp 16 16  


 


B/P (MAP) 152/72 (98) 142/72 (95)  


 


Pulse Ox 95 95  


 


O2 Delivery Room Air Room Air  














Intake and Output   


 


 10/25/21 10/25/21 10/26/21





 15:00 23:00 07:00


 


Intake Total 280 ml 100 ml 130 ml


 


Output Total   1 ml


 


Balance 280 ml 100 ml 129 ml











Justicifation of Admission Dx:


Justifications for Admission:


Justification of Admission Dx:  No











MARYANNE ANDREWS MD           Oct 26, 2021 08:20